# Patient Record
Sex: MALE | Race: WHITE | Employment: OTHER | URBAN - METROPOLITAN AREA
[De-identification: names, ages, dates, MRNs, and addresses within clinical notes are randomized per-mention and may not be internally consistent; named-entity substitution may affect disease eponyms.]

---

## 2017-01-01 ENCOUNTER — HOSPITAL ENCOUNTER (OUTPATIENT)
Age: 77
Setting detail: OUTPATIENT SURGERY
Discharge: HOME | End: 2017-08-28
Attending: UROLOGY | Admitting: UROLOGY
Payer: MEDICARE

## 2017-01-01 ENCOUNTER — HOSPITAL ENCOUNTER (OUTPATIENT)
Age: 77
Setting detail: OUTPATIENT SURGERY
Discharge: HOME | End: 2017-09-06
Attending: UROLOGY | Admitting: UROLOGY
Payer: MEDICARE

## 2017-01-01 ENCOUNTER — HOSPITAL ENCOUNTER (EMERGENCY)
Age: 77
Discharge: HOME | End: 2017-05-26
Attending: EMERGENCY MEDICINE
Payer: MEDICARE

## 2017-01-01 ENCOUNTER — APPOINTMENT (OUTPATIENT)
Dept: CT IMAGING | Age: 77
DRG: 190 | End: 2017-01-01
Attending: EMERGENCY MEDICINE
Payer: MEDICARE

## 2017-01-01 ENCOUNTER — APPOINTMENT (OUTPATIENT)
Dept: GENERAL RADIOLOGY | Age: 77
End: 2017-01-01
Attending: UROLOGY
Payer: MEDICARE

## 2017-01-01 ENCOUNTER — ANESTHESIA (OUTPATIENT)
Dept: SURGERY | Age: 77
End: 2017-01-01
Payer: MEDICARE

## 2017-01-01 ENCOUNTER — APPOINTMENT (OUTPATIENT)
Dept: GENERAL RADIOLOGY | Age: 77
DRG: 190 | End: 2017-01-01
Attending: EMERGENCY MEDICINE
Payer: MEDICARE

## 2017-01-01 ENCOUNTER — APPOINTMENT (OUTPATIENT)
Dept: CT IMAGING | Age: 77
End: 2017-01-01
Attending: EMERGENCY MEDICINE
Payer: MEDICARE

## 2017-01-01 ENCOUNTER — APPOINTMENT (OUTPATIENT)
Dept: GENERAL RADIOLOGY | Age: 77
End: 2017-01-01
Attending: EMERGENCY MEDICINE
Payer: MEDICARE

## 2017-01-01 ENCOUNTER — APPOINTMENT (OUTPATIENT)
Dept: GENERAL RADIOLOGY | Age: 77
DRG: 190 | End: 2017-01-01
Attending: HOSPITALIST
Payer: MEDICARE

## 2017-01-01 ENCOUNTER — ANESTHESIA EVENT (OUTPATIENT)
Dept: SURGERY | Age: 77
End: 2017-01-01
Payer: MEDICARE

## 2017-01-01 ENCOUNTER — HOSPITAL ENCOUNTER (OUTPATIENT)
Dept: PREADMISSION TESTING | Age: 77
Discharge: HOME | End: 2017-08-16
Payer: MEDICARE

## 2017-01-01 ENCOUNTER — HOSPITAL ENCOUNTER (OUTPATIENT)
Dept: LAB | Age: 77
Discharge: HOME | End: 2017-08-16
Payer: MEDICARE

## 2017-01-01 ENCOUNTER — HOSPITAL ENCOUNTER (INPATIENT)
Age: 77
LOS: 5 days | Discharge: HOME HEALTH | DRG: 190 | End: 2017-03-28
Attending: EMERGENCY MEDICINE | Admitting: HOSPITALIST
Payer: MEDICARE

## 2017-01-01 VITALS
HEART RATE: 65 BPM | TEMPERATURE: 98.2 F | BODY MASS INDEX: 39.68 KG/M2 | RESPIRATION RATE: 16 BRPM | WEIGHT: 246.91 LBS | HEIGHT: 66 IN | OXYGEN SATURATION: 95 % | SYSTOLIC BLOOD PRESSURE: 142 MMHG | DIASTOLIC BLOOD PRESSURE: 66 MMHG

## 2017-01-01 VITALS
DIASTOLIC BLOOD PRESSURE: 67 MMHG | HEART RATE: 64 BPM | WEIGHT: 249.1 LBS | RESPIRATION RATE: 15 BRPM | SYSTOLIC BLOOD PRESSURE: 121 MMHG | OXYGEN SATURATION: 97 % | HEIGHT: 68 IN | BODY MASS INDEX: 37.75 KG/M2 | TEMPERATURE: 97.7 F

## 2017-01-01 VITALS
SYSTOLIC BLOOD PRESSURE: 146 MMHG | DIASTOLIC BLOOD PRESSURE: 75 MMHG | HEIGHT: 68 IN | HEART RATE: 63 BPM | OXYGEN SATURATION: 92 % | WEIGHT: 245.31 LBS | BODY MASS INDEX: 37.18 KG/M2 | TEMPERATURE: 97 F | RESPIRATION RATE: 16 BRPM

## 2017-01-01 VITALS
TEMPERATURE: 97.9 F | DIASTOLIC BLOOD PRESSURE: 84 MMHG | WEIGHT: 250 LBS | RESPIRATION RATE: 18 BRPM | HEIGHT: 66 IN | HEART RATE: 65 BPM | SYSTOLIC BLOOD PRESSURE: 143 MMHG | BODY MASS INDEX: 40.18 KG/M2 | OXYGEN SATURATION: 93 %

## 2017-01-01 DIAGNOSIS — R09.02 HYPOXIA: Primary | ICD-10-CM

## 2017-01-01 DIAGNOSIS — C61 MALIGNANT NEOPLASM OF PROSTATE (HCC): ICD-10-CM

## 2017-01-01 DIAGNOSIS — C61 PROSTATE CA (HCC): ICD-10-CM

## 2017-01-01 DIAGNOSIS — C67.9 BLADDER CANCER (HCC): ICD-10-CM

## 2017-01-01 DIAGNOSIS — Z01.818 PRE-OP TESTING: ICD-10-CM

## 2017-01-01 DIAGNOSIS — C67.9 MALIGNANT NEOPLASM OF URINARY BLADDER, UNSPECIFIED SITE (HCC): Chronic | ICD-10-CM

## 2017-01-01 DIAGNOSIS — N20.0 KIDNEY STONE: ICD-10-CM

## 2017-01-01 DIAGNOSIS — R79.89 ELEVATED LACTIC ACID LEVEL: ICD-10-CM

## 2017-01-01 DIAGNOSIS — J20.8 ACUTE BRONCHITIS DUE TO OTHER SPECIFIED ORGANISMS: Primary | ICD-10-CM

## 2017-01-01 LAB
ALBUMIN SERPL BCP-MCNC: 3.1 G/DL (ref 3.4–5)
ALBUMIN SERPL BCP-MCNC: 3.5 G/DL (ref 3.4–5)
ALBUMIN/GLOB SERPL: 1 {RATIO} (ref 0.8–1.7)
ALBUMIN/GLOB SERPL: 1.1 {RATIO} (ref 0.8–1.7)
ALP SERPL-CCNC: 52 U/L (ref 45–117)
ALP SERPL-CCNC: 67 U/L (ref 45–117)
ALT SERPL-CCNC: 19 U/L (ref 16–61)
ALT SERPL-CCNC: 23 U/L (ref 16–61)
ANION GAP BLD CALC-SCNC: 11 MMOL/L (ref 3–18)
ANION GAP BLD CALC-SCNC: 13 MMOL/L (ref 3–18)
ANION GAP BLD CALC-SCNC: 5 MMOL/L (ref 3–18)
ANION GAP BLD CALC-SCNC: 8 MMOL/L (ref 3–18)
ANION GAP BLD CALC-SCNC: 9 MMOL/L (ref 3–18)
ANION GAP SERPL CALC-SCNC: 10 MMOL/L (ref 3–18)
APPEARANCE UR: CLEAR
APPEARANCE UR: CLEAR
APTT PPP: 31.2 SEC (ref 23–36.4)
APTT PPP: 39.1 SEC (ref 23–36.4)
ARTERIAL PATENCY WRIST A: YES
AST SERPL W P-5'-P-CCNC: 34 U/L (ref 15–37)
AST SERPL W P-5'-P-CCNC: 46 U/L (ref 15–37)
ATRIAL RATE: 357 BPM
ATRIAL RATE: 60 BPM
ATRIAL RATE: 74 BPM
BACTERIA SPEC CULT: NORMAL
BACTERIA URNS QL MICRO: ABNORMAL /HPF
BACTERIA URNS QL MICRO: ABNORMAL /HPF
BASE DEFICIT BLD-SCNC: 3 MMOL/L
BASOPHILS # BLD AUTO: 0 K/UL (ref 0–0.06)
BASOPHILS # BLD: 0 % (ref 0–2)
BASOPHILS # BLD: 1 % (ref 0–2)
BASOPHILS # BLD: 1 % (ref 0–2)
BDY SITE: ABNORMAL
BILIRUB SERPL-MCNC: 0.4 MG/DL (ref 0.2–1)
BILIRUB SERPL-MCNC: 0.7 MG/DL (ref 0.2–1)
BILIRUB UR QL: NEGATIVE
BILIRUB UR QL: NEGATIVE
BNP SERPL-MCNC: 1602 PG/ML (ref 0–1800)
BUN BLD-MCNC: 14 MG/DL (ref 7–18)
BUN SERPL-MCNC: 16 MG/DL (ref 7–18)
BUN SERPL-MCNC: 16 MG/DL (ref 7–18)
BUN SERPL-MCNC: 20 MG/DL (ref 7–18)
BUN SERPL-MCNC: 22 MG/DL (ref 7–18)
BUN SERPL-MCNC: 27 MG/DL (ref 7–18)
BUN SERPL-MCNC: 29 MG/DL (ref 7–18)
BUN/CREAT SERPL: 14 (ref 12–20)
BUN/CREAT SERPL: 15 (ref 12–20)
BUN/CREAT SERPL: 16 (ref 12–20)
BUN/CREAT SERPL: 19 (ref 12–20)
BUN/CREAT SERPL: 22 (ref 12–20)
BUN/CREAT SERPL: 24 (ref 12–20)
CA-I SERPL-SCNC: 1.1 MMOL/L (ref 1.12–1.32)
CALCIUM SERPL-MCNC: 7.5 MG/DL (ref 8.5–10.1)
CALCIUM SERPL-MCNC: 7.6 MG/DL (ref 8.5–10.1)
CALCIUM SERPL-MCNC: 7.7 MG/DL (ref 8.5–10.1)
CALCIUM SERPL-MCNC: 8.1 MG/DL (ref 8.5–10.1)
CALCIUM SERPL-MCNC: 8.6 MG/DL (ref 8.5–10.1)
CALCIUM SERPL-MCNC: 8.7 MG/DL (ref 8.5–10.1)
CALCULATED R AXIS, ECG10: -57 DEGREES
CALCULATED R AXIS, ECG10: -68 DEGREES
CALCULATED R AXIS, ECG10: -73 DEGREES
CALCULATED T AXIS, ECG11: 109 DEGREES
CALCULATED T AXIS, ECG11: 110 DEGREES
CALCULATED T AXIS, ECG11: 111 DEGREES
CHLORIDE BLD-SCNC: 108 MMOL/L (ref 100–108)
CHLORIDE SERPL-SCNC: 103 MMOL/L (ref 100–108)
CHLORIDE SERPL-SCNC: 103 MMOL/L (ref 100–108)
CHLORIDE SERPL-SCNC: 105 MMOL/L (ref 100–108)
CHLORIDE SERPL-SCNC: 105 MMOL/L (ref 100–108)
CHLORIDE SERPL-SCNC: 107 MMOL/L (ref 100–108)
CHLORIDE SERPL-SCNC: 107 MMOL/L (ref 100–108)
CK MB CFR SERPL CALC: 1 % (ref 0–4)
CK MB CFR SERPL CALC: 2.1 % (ref 0–4)
CK MB SERPL-MCNC: 1.6 NG/ML (ref 5–25)
CK MB SERPL-MCNC: 3.1 NG/ML (ref 5–25)
CK SERPL-CCNC: 309 U/L (ref 39–308)
CK SERPL-CCNC: 75 U/L (ref 39–308)
CO2 SERPL-SCNC: 24 MMOL/L (ref 21–32)
CO2 SERPL-SCNC: 27 MMOL/L (ref 21–32)
CO2 SERPL-SCNC: 30 MMOL/L (ref 21–32)
COLOR UR: YELLOW
COLOR UR: YELLOW
CREAT SERPL-MCNC: 1 MG/DL (ref 0.6–1.3)
CREAT SERPL-MCNC: 1.03 MG/DL (ref 0.6–1.3)
CREAT SERPL-MCNC: 1.18 MG/DL (ref 0.6–1.3)
CREAT SERPL-MCNC: 1.19 MG/DL (ref 0.6–1.3)
CREAT SERPL-MCNC: 1.22 MG/DL (ref 0.6–1.3)
CREAT SERPL-MCNC: 1.43 MG/DL (ref 0.6–1.3)
DIAGNOSIS, 93000: NORMAL
DIFFERENTIAL METHOD BLD: ABNORMAL
EOSINOPHIL # BLD: 0 K/UL (ref 0–0.4)
EOSINOPHIL # BLD: 0.1 K/UL (ref 0–0.4)
EOSINOPHIL NFR BLD: 0 % (ref 0–5)
EOSINOPHIL NFR BLD: 2 % (ref 0–5)
EPITH CASTS URNS QL MICRO: ABNORMAL /LPF (ref 0–5)
EPITH CASTS URNS QL MICRO: ABNORMAL /LPF (ref 0–5)
ERYTHROCYTE [DISTWIDTH] IN BLOOD BY AUTOMATED COUNT: 15.5 % (ref 11.6–14.5)
ERYTHROCYTE [DISTWIDTH] IN BLOOD BY AUTOMATED COUNT: 15.7 % (ref 11.6–14.5)
ERYTHROCYTE [DISTWIDTH] IN BLOOD BY AUTOMATED COUNT: 15.7 % (ref 11.6–14.5)
ERYTHROCYTE [DISTWIDTH] IN BLOOD BY AUTOMATED COUNT: 15.8 % (ref 11.6–14.5)
ERYTHROCYTE [DISTWIDTH] IN BLOOD BY AUTOMATED COUNT: 18.8 % (ref 11.6–14.5)
EST. AVERAGE GLUCOSE BLD GHB EST-MCNC: 140 MG/DL
FLUAV AG NPH QL IA: NEGATIVE
FLUBV AG NOSE QL IA: NEGATIVE
GAS FLOW.O2 O2 DELIVERY SYS: ABNORMAL L/MIN
GAS FLOW.O2 SETTING OXYMISER: 40 L/M
GLOBULIN SER CALC-MCNC: 3 G/DL (ref 2–4)
GLOBULIN SER CALC-MCNC: 3.2 G/DL (ref 2–4)
GLUCOSE BLD STRIP.AUTO-MCNC: 100 MG/DL (ref 70–110)
GLUCOSE BLD STRIP.AUTO-MCNC: 102 MG/DL (ref 70–110)
GLUCOSE BLD STRIP.AUTO-MCNC: 105 MG/DL (ref 70–110)
GLUCOSE BLD STRIP.AUTO-MCNC: 106 MG/DL (ref 70–110)
GLUCOSE BLD STRIP.AUTO-MCNC: 113 MG/DL (ref 70–110)
GLUCOSE BLD STRIP.AUTO-MCNC: 116 MG/DL (ref 74–106)
GLUCOSE BLD STRIP.AUTO-MCNC: 118 MG/DL (ref 70–110)
GLUCOSE BLD STRIP.AUTO-MCNC: 124 MG/DL (ref 70–110)
GLUCOSE BLD STRIP.AUTO-MCNC: 129 MG/DL (ref 70–110)
GLUCOSE BLD STRIP.AUTO-MCNC: 129 MG/DL (ref 70–110)
GLUCOSE BLD STRIP.AUTO-MCNC: 130 MG/DL (ref 70–110)
GLUCOSE BLD STRIP.AUTO-MCNC: 130 MG/DL (ref 70–110)
GLUCOSE BLD STRIP.AUTO-MCNC: 132 MG/DL (ref 70–110)
GLUCOSE BLD STRIP.AUTO-MCNC: 143 MG/DL (ref 70–110)
GLUCOSE BLD STRIP.AUTO-MCNC: 150 MG/DL (ref 70–110)
GLUCOSE BLD STRIP.AUTO-MCNC: 154 MG/DL (ref 70–110)
GLUCOSE BLD STRIP.AUTO-MCNC: 163 MG/DL (ref 70–110)
GLUCOSE BLD STRIP.AUTO-MCNC: 164 MG/DL (ref 70–110)
GLUCOSE BLD STRIP.AUTO-MCNC: 197 MG/DL (ref 70–110)
GLUCOSE BLD STRIP.AUTO-MCNC: 96 MG/DL (ref 70–110)
GLUCOSE BLD STRIP.AUTO-MCNC: 97 MG/DL (ref 70–110)
GLUCOSE BLD STRIP.AUTO-MCNC: 98 MG/DL (ref 70–110)
GLUCOSE BLD STRIP.AUTO-MCNC: 99 MG/DL (ref 70–110)
GLUCOSE SERPL-MCNC: 107 MG/DL (ref 74–99)
GLUCOSE SERPL-MCNC: 109 MG/DL (ref 74–99)
GLUCOSE SERPL-MCNC: 120 MG/DL (ref 74–99)
GLUCOSE SERPL-MCNC: 127 MG/DL (ref 74–99)
GLUCOSE SERPL-MCNC: 82 MG/DL (ref 74–99)
GLUCOSE SERPL-MCNC: 96 MG/DL (ref 74–99)
GLUCOSE UR STRIP.AUTO-MCNC: NEGATIVE MG/DL
GLUCOSE UR STRIP.AUTO-MCNC: NEGATIVE MG/DL
GRAN CASTS URNS QL MICRO: ABNORMAL /LPF
HBA1C MFR BLD: 6.5 % (ref 4.2–5.6)
HCO3 BLD-SCNC: 22.6 MMOL/L (ref 22–26)
HCT VFR BLD AUTO: 37.9 % (ref 36–48)
HCT VFR BLD AUTO: 37.9 % (ref 36–48)
HCT VFR BLD AUTO: 38 % (ref 36–48)
HCT VFR BLD AUTO: 38.1 % (ref 36–48)
HCT VFR BLD AUTO: 39.2 % (ref 36–48)
HCT VFR BLD CALC: 42 % (ref 36–49)
HGB BLD-MCNC: 11.3 G/DL (ref 13–16)
HGB BLD-MCNC: 11.4 G/DL (ref 13–16)
HGB BLD-MCNC: 11.6 G/DL (ref 13–16)
HGB BLD-MCNC: 11.7 G/DL (ref 13–16)
HGB BLD-MCNC: 12.1 G/DL (ref 13–16)
HGB BLD-MCNC: 14.3 G/DL (ref 12–16)
HGB UR QL STRIP: ABNORMAL
HGB UR QL STRIP: NEGATIVE
HYALINE CASTS URNS QL MICRO: ABNORMAL /LPF (ref 0–2)
INR PPP: 1 (ref 0.8–1.2)
INR PPP: 1 (ref 0.8–1.2)
INR PPP: 1.5 (ref 0.8–1.2)
INR PPP: 1.5 (ref 0.8–1.2)
INR PPP: 1.6 (ref 0.8–1.2)
INR PPP: 1.6 (ref 0.8–1.2)
INR PPP: 1.7 (ref 0.8–1.2)
INR PPP: 2 (ref 0.8–1.2)
INR PPP: 2 (ref 0.8–1.2)
INR PPP: 2.3 (ref 0.8–1.2)
KETONES UR QL STRIP.AUTO: ABNORMAL MG/DL
KETONES UR QL STRIP.AUTO: NEGATIVE MG/DL
LACTATE BLD-SCNC: 2.6 MMOL/L (ref 0.4–2)
LACTATE BLD-SCNC: 4.6 MMOL/L (ref 0.4–2)
LEUKOCYTE ESTERASE UR QL STRIP.AUTO: ABNORMAL
LEUKOCYTE ESTERASE UR QL STRIP.AUTO: ABNORMAL
LYMPHOCYTES # BLD AUTO: 18 % (ref 21–52)
LYMPHOCYTES # BLD AUTO: 25 % (ref 21–52)
LYMPHOCYTES # BLD AUTO: 27 % (ref 21–52)
LYMPHOCYTES # BLD AUTO: 32 % (ref 21–52)
LYMPHOCYTES # BLD AUTO: 32 % (ref 21–52)
LYMPHOCYTES # BLD: 1.2 K/UL (ref 0.9–3.6)
LYMPHOCYTES # BLD: 1.3 K/UL (ref 0.9–3.6)
LYMPHOCYTES # BLD: 1.4 K/UL (ref 0.9–3.6)
LYMPHOCYTES # BLD: 1.4 K/UL (ref 0.9–3.6)
LYMPHOCYTES # BLD: 2.7 K/UL (ref 0.9–3.6)
MAGNESIUM SERPL-MCNC: 1.9 MG/DL (ref 1.8–2.4)
MAGNESIUM SERPL-MCNC: 2.3 MG/DL (ref 1.6–2.6)
MCH RBC QN AUTO: 24.2 PG (ref 24–34)
MCH RBC QN AUTO: 24.3 PG (ref 24–34)
MCH RBC QN AUTO: 24.6 PG (ref 24–34)
MCHC RBC AUTO-ENTMCNC: 29.8 G/DL (ref 31–37)
MCHC RBC AUTO-ENTMCNC: 30 G/DL (ref 31–37)
MCHC RBC AUTO-ENTMCNC: 30.6 G/DL (ref 31–37)
MCHC RBC AUTO-ENTMCNC: 30.7 G/DL (ref 31–37)
MCHC RBC AUTO-ENTMCNC: 30.9 G/DL (ref 31–37)
MCV RBC AUTO: 78.7 FL (ref 74–97)
MCV RBC AUTO: 78.9 FL (ref 74–97)
MCV RBC AUTO: 80.3 FL (ref 74–97)
MCV RBC AUTO: 80.5 FL (ref 74–97)
MCV RBC AUTO: 81.2 FL (ref 74–97)
MONOCYTES # BLD: 0.5 K/UL (ref 0.05–1.2)
MONOCYTES # BLD: 0.5 K/UL (ref 0.05–1.2)
MONOCYTES # BLD: 0.6 K/UL (ref 0.05–1.2)
MONOCYTES # BLD: 0.8 K/UL (ref 0.05–1.2)
MONOCYTES # BLD: 0.9 K/UL (ref 0.05–1.2)
MONOCYTES NFR BLD AUTO: 10 % (ref 3–10)
MONOCYTES NFR BLD AUTO: 10 % (ref 3–10)
MONOCYTES NFR BLD AUTO: 11 % (ref 3–10)
MONOCYTES NFR BLD AUTO: 12 % (ref 3–10)
MONOCYTES NFR BLD AUTO: 9 % (ref 3–10)
MUCOUS THREADS URNS QL MICRO: ABNORMAL /LPF
NEUTS SEG # BLD: 2.6 K/UL (ref 1.8–8)
NEUTS SEG # BLD: 3 K/UL (ref 1.8–8)
NEUTS SEG # BLD: 3.8 K/UL (ref 1.8–8)
NEUTS SEG # BLD: 4.7 K/UL (ref 1.8–8)
NEUTS SEG # BLD: 4.9 K/UL (ref 1.8–8)
NEUTS SEG NFR BLD AUTO: 55 % (ref 40–73)
NEUTS SEG NFR BLD AUTO: 58 % (ref 40–73)
NEUTS SEG NFR BLD AUTO: 62 % (ref 40–73)
NEUTS SEG NFR BLD AUTO: 65 % (ref 40–73)
NEUTS SEG NFR BLD AUTO: 70 % (ref 40–73)
NITRITE UR QL STRIP.AUTO: NEGATIVE
NITRITE UR QL STRIP.AUTO: NEGATIVE
O2/TOTAL GAS SETTING VFR VENT: 60 %
PCO2 BLD: 42.4 MMHG (ref 35–45)
PH BLD: 7.34 [PH] (ref 7.35–7.45)
PH UR STRIP: 5.5 [PH] (ref 5–8)
PH UR STRIP: 6 [PH] (ref 5–8)
PLATELET # BLD AUTO: 153 K/UL (ref 135–420)
PLATELET # BLD AUTO: 159 K/UL (ref 135–420)
PLATELET # BLD AUTO: 186 K/UL (ref 135–420)
PLATELET # BLD AUTO: 194 K/UL (ref 135–420)
PLATELET # BLD AUTO: 225 K/UL (ref 135–420)
PMV BLD AUTO: 10.2 FL (ref 9.2–11.8)
PMV BLD AUTO: 10.2 FL (ref 9.2–11.8)
PMV BLD AUTO: 10.4 FL (ref 9.2–11.8)
PMV BLD AUTO: 9.7 FL (ref 9.2–11.8)
PMV BLD AUTO: 9.9 FL (ref 9.2–11.8)
PO2 BLD: 60 MMHG (ref 80–100)
POTASSIUM BLD-SCNC: 5.1 MMOL/L (ref 3.5–5.5)
POTASSIUM SERPL-SCNC: 3.2 MMOL/L (ref 3.5–5.5)
POTASSIUM SERPL-SCNC: 3.6 MMOL/L (ref 3.5–5.5)
POTASSIUM SERPL-SCNC: 3.6 MMOL/L (ref 3.5–5.5)
POTASSIUM SERPL-SCNC: 3.7 MMOL/L (ref 3.5–5.5)
POTASSIUM SERPL-SCNC: 3.9 MMOL/L (ref 3.5–5.5)
POTASSIUM SERPL-SCNC: 4 MMOL/L (ref 3.5–5.5)
PROT SERPL-MCNC: 6.1 G/DL (ref 6.4–8.2)
PROT SERPL-MCNC: 6.7 G/DL (ref 6.4–8.2)
PROT UR STRIP-MCNC: 100 MG/DL
PROT UR STRIP-MCNC: 30 MG/DL
PROTHROMBIN TIME: 12.4 SEC (ref 11.5–15.2)
PROTHROMBIN TIME: 12.7 SEC (ref 11.5–15.2)
PROTHROMBIN TIME: 17.3 SEC (ref 11.5–15.2)
PROTHROMBIN TIME: 17.4 SEC (ref 11.5–15.2)
PROTHROMBIN TIME: 18 SEC (ref 11.5–15.2)
PROTHROMBIN TIME: 18.5 SEC (ref 11.5–15.2)
PROTHROMBIN TIME: 18.9 SEC (ref 11.5–15.2)
PROTHROMBIN TIME: 21.4 SEC (ref 11.5–15.2)
PROTHROMBIN TIME: 21.5 SEC (ref 11.5–15.2)
PROTHROMBIN TIME: 24.3 SEC (ref 11.5–15.2)
Q-T INTERVAL, ECG07: 488 MS
Q-T INTERVAL, ECG07: 490 MS
Q-T INTERVAL, ECG07: 492 MS
QRS DURATION, ECG06: 178 MS
QRS DURATION, ECG06: 188 MS
QRS DURATION, ECG06: 188 MS
QTC CALCULATION (BEZET), ECG08: 490 MS
QTC CALCULATION (BEZET), ECG08: 499 MS
QTC CALCULATION (BEZET), ECG08: 503 MS
RBC # BLD AUTO: 4.67 M/UL (ref 4.7–5.5)
RBC # BLD AUTO: 4.72 M/UL (ref 4.7–5.5)
RBC # BLD AUTO: 4.72 M/UL (ref 4.7–5.5)
RBC # BLD AUTO: 4.84 M/UL (ref 4.7–5.5)
RBC # BLD AUTO: 4.97 M/UL (ref 4.7–5.5)
RBC #/AREA URNS HPF: ABNORMAL /HPF (ref 0–5)
RBC #/AREA URNS HPF: ABNORMAL /HPF (ref 0–5)
SAO2 % BLD: 89 % (ref 92–97)
SERVICE CMNT-IMP: ABNORMAL
SERVICE CMNT-IMP: NORMAL
SODIUM BLD-SCNC: 143 MMOL/L (ref 136–145)
SODIUM SERPL-SCNC: 139 MMOL/L (ref 136–145)
SODIUM SERPL-SCNC: 141 MMOL/L (ref 136–145)
SODIUM SERPL-SCNC: 142 MMOL/L (ref 136–145)
SP GR UR REFRACTOMETRY: 1.02 (ref 1–1.03)
SP GR UR REFRACTOMETRY: 1.03 (ref 1–1.03)
SPECIMEN TYPE: ABNORMAL
TOTAL RESP. RATE, ITRR: 22
TROPONIN I SERPL-MCNC: 0.07 NG/ML (ref 0–0.04)
TROPONIN I SERPL-MCNC: <0.02 NG/ML (ref 0–0.04)
UROBILINOGEN UR QL STRIP.AUTO: 1 EU/DL (ref 0.2–1)
UROBILINOGEN UR QL STRIP.AUTO: 1 EU/DL (ref 0.2–1)
VENTRICULAR RATE, ECG03: 60 BPM
VENTRICULAR RATE, ECG03: 63 BPM
VENTRICULAR RATE, ECG03: 63 BPM
WBC # BLD AUTO: 4.5 K/UL (ref 4.6–13.2)
WBC # BLD AUTO: 4.8 K/UL (ref 4.6–13.2)
WBC # BLD AUTO: 5.7 K/UL (ref 4.6–13.2)
WBC # BLD AUTO: 7 K/UL (ref 4.6–13.2)
WBC # BLD AUTO: 8.4 K/UL (ref 4.6–13.2)
WBC URNS QL MICRO: ABNORMAL /HPF (ref 0–4)
WBC URNS QL MICRO: ABNORMAL /HPF (ref 0–4)

## 2017-01-01 PROCEDURE — 85025 COMPLETE CBC W/AUTO DIFF WBC: CPT | Performed by: EMERGENCY MEDICINE

## 2017-01-01 PROCEDURE — 77010033678 HC OXYGEN DAILY

## 2017-01-01 PROCEDURE — 77030013140 HC MSK NEB VYRM -A

## 2017-01-01 PROCEDURE — 87804 INFLUENZA ASSAY W/OPTIC: CPT | Performed by: EMERGENCY MEDICINE

## 2017-01-01 PROCEDURE — 76210000016 HC OR PH I REC 1 TO 1.5 HR: Performed by: UROLOGY

## 2017-01-01 PROCEDURE — C1758 CATHETER, URETERAL: HCPCS | Performed by: UROLOGY

## 2017-01-01 PROCEDURE — 74011250636 HC RX REV CODE- 250/636: Performed by: UROLOGY

## 2017-01-01 PROCEDURE — 82962 GLUCOSE BLOOD TEST: CPT

## 2017-01-01 PROCEDURE — 74011000250 HC RX REV CODE- 250

## 2017-01-01 PROCEDURE — 77030032490 HC SLV COMPR SCD KNE COVD -B: Performed by: UROLOGY

## 2017-01-01 PROCEDURE — 74011250636 HC RX REV CODE- 250/636: Performed by: NURSE ANESTHETIST, CERTIFIED REGISTERED

## 2017-01-01 PROCEDURE — 93005 ELECTROCARDIOGRAM TRACING: CPT

## 2017-01-01 PROCEDURE — 83605 ASSAY OF LACTIC ACID: CPT

## 2017-01-01 PROCEDURE — 77030034849

## 2017-01-01 PROCEDURE — 36415 COLL VENOUS BLD VENIPUNCTURE: CPT | Performed by: HOSPITALIST

## 2017-01-01 PROCEDURE — 94640 AIRWAY INHALATION TREATMENT: CPT

## 2017-01-01 PROCEDURE — 65660000000 HC RM CCU STEPDOWN

## 2017-01-01 PROCEDURE — 74011250637 HC RX REV CODE- 250/637: Performed by: NURSE ANESTHETIST, CERTIFIED REGISTERED

## 2017-01-01 PROCEDURE — 74011250636 HC RX REV CODE- 250/636: Performed by: EMERGENCY MEDICINE

## 2017-01-01 PROCEDURE — 87086 URINE CULTURE/COLONY COUNT: CPT | Performed by: EMERGENCY MEDICINE

## 2017-01-01 PROCEDURE — 74011250636 HC RX REV CODE- 250/636: Performed by: HOSPITALIST

## 2017-01-01 PROCEDURE — 74011250636 HC RX REV CODE- 250/636

## 2017-01-01 PROCEDURE — 85610 PROTHROMBIN TIME: CPT | Performed by: UROLOGY

## 2017-01-01 PROCEDURE — 81001 URINALYSIS AUTO W/SCOPE: CPT | Performed by: UROLOGY

## 2017-01-01 PROCEDURE — 85610 PROTHROMBIN TIME: CPT | Performed by: HOSPITALIST

## 2017-01-01 PROCEDURE — 96365 THER/PROPH/DIAG IV INF INIT: CPT

## 2017-01-01 PROCEDURE — 76010000161 HC OR TIME 1 TO 1.5 HR INTENSV-TIER 1: Performed by: UROLOGY

## 2017-01-01 PROCEDURE — C1769 GUIDE WIRE: HCPCS | Performed by: UROLOGY

## 2017-01-01 PROCEDURE — 87086 URINE CULTURE/COLONY COUNT: CPT | Performed by: UROLOGY

## 2017-01-01 PROCEDURE — 74011000250 HC RX REV CODE- 250: Performed by: HOSPITALIST

## 2017-01-01 PROCEDURE — 70450 CT HEAD/BRAIN W/O DYE: CPT

## 2017-01-01 PROCEDURE — 71275 CT ANGIOGRAPHY CHEST: CPT

## 2017-01-01 PROCEDURE — C2617 STENT, NON-COR, TEM W/O DEL: HCPCS | Performed by: UROLOGY

## 2017-01-01 PROCEDURE — 85025 COMPLETE CBC W/AUTO DIFF WBC: CPT | Performed by: HOSPITALIST

## 2017-01-01 PROCEDURE — 77030013079 HC BLNKT BAIR HGGR 3M -A: Performed by: NURSE ANESTHETIST, CERTIFIED REGISTERED

## 2017-01-01 PROCEDURE — 74011000258 HC RX REV CODE- 258: Performed by: EMERGENCY MEDICINE

## 2017-01-01 PROCEDURE — 51702 INSERT TEMP BLADDER CATH: CPT

## 2017-01-01 PROCEDURE — 82550 ASSAY OF CK (CPK): CPT | Performed by: EMERGENCY MEDICINE

## 2017-01-01 PROCEDURE — 74011636320 HC RX REV CODE- 636/320: Performed by: UROLOGY

## 2017-01-01 PROCEDURE — 96361 HYDRATE IV INFUSION ADD-ON: CPT

## 2017-01-01 PROCEDURE — 97530 THERAPEUTIC ACTIVITIES: CPT

## 2017-01-01 PROCEDURE — 80048 BASIC METABOLIC PNL TOTAL CA: CPT | Performed by: HOSPITALIST

## 2017-01-01 PROCEDURE — 82947 ASSAY GLUCOSE BLOOD QUANT: CPT

## 2017-01-01 PROCEDURE — 74011250637 HC RX REV CODE- 250/637: Performed by: HOSPITALIST

## 2017-01-01 PROCEDURE — 96367 TX/PROPH/DG ADDL SEQ IV INF: CPT

## 2017-01-01 PROCEDURE — 85610 PROTHROMBIN TIME: CPT | Performed by: EMERGENCY MEDICINE

## 2017-01-01 PROCEDURE — 80048 BASIC METABOLIC PNL TOTAL CA: CPT | Performed by: UROLOGY

## 2017-01-01 PROCEDURE — 85730 THROMBOPLASTIN TIME PARTIAL: CPT | Performed by: EMERGENCY MEDICINE

## 2017-01-01 PROCEDURE — 76210000021 HC REC RM PH II 0.5 TO 1 HR: Performed by: UROLOGY

## 2017-01-01 PROCEDURE — 85610 PROTHROMBIN TIME: CPT

## 2017-01-01 PROCEDURE — 71010 XR CHEST PORT: CPT

## 2017-01-01 PROCEDURE — 97161 PT EVAL LOW COMPLEX 20 MIN: CPT

## 2017-01-01 PROCEDURE — 74011636320 HC RX REV CODE- 636/320: Performed by: EMERGENCY MEDICINE

## 2017-01-01 PROCEDURE — 36415 COLL VENOUS BLD VENIPUNCTURE: CPT | Performed by: UROLOGY

## 2017-01-01 PROCEDURE — 74420 UROGRAPHY RTRGR +-KUB: CPT

## 2017-01-01 PROCEDURE — 83735 ASSAY OF MAGNESIUM: CPT | Performed by: EMERGENCY MEDICINE

## 2017-01-01 PROCEDURE — 71020 XR CHEST PA LAT: CPT

## 2017-01-01 PROCEDURE — 74011250637 HC RX REV CODE- 250/637: Performed by: EMERGENCY MEDICINE

## 2017-01-01 PROCEDURE — 77030018823 HC SLV COMPR VENO -B: Performed by: UROLOGY

## 2017-01-01 PROCEDURE — 83036 HEMOGLOBIN GLYCOSYLATED A1C: CPT | Performed by: HOSPITALIST

## 2017-01-01 PROCEDURE — 80053 COMPREHEN METABOLIC PANEL: CPT | Performed by: EMERGENCY MEDICINE

## 2017-01-01 PROCEDURE — 77030018836 HC SOL IRR NACL ICUM -A: Performed by: UROLOGY

## 2017-01-01 PROCEDURE — 77030018846 HC SOL IRR STRL H20 ICUM -A: Performed by: UROLOGY

## 2017-01-01 PROCEDURE — 77030008477 HC STYL SATN SLP COVD -A: Performed by: ANESTHESIOLOGY

## 2017-01-01 PROCEDURE — 87040 BLOOD CULTURE FOR BACTERIA: CPT | Performed by: EMERGENCY MEDICINE

## 2017-01-01 PROCEDURE — 77030008683 HC TU ET CUF COVD -A: Performed by: ANESTHESIOLOGY

## 2017-01-01 PROCEDURE — 82803 BLOOD GASES ANY COMBINATION: CPT

## 2017-01-01 PROCEDURE — 76060000033 HC ANESTHESIA 1 TO 1.5 HR: Performed by: UROLOGY

## 2017-01-01 PROCEDURE — 80053 COMPREHEN METABOLIC PANEL: CPT | Performed by: HOSPITALIST

## 2017-01-01 PROCEDURE — 99285 EMERGENCY DEPT VISIT HI MDM: CPT

## 2017-01-01 PROCEDURE — 36600 WITHDRAWAL OF ARTERIAL BLOOD: CPT

## 2017-01-01 PROCEDURE — 80048 BASIC METABOLIC PNL TOTAL CA: CPT | Performed by: EMERGENCY MEDICINE

## 2017-01-01 PROCEDURE — 76210000026 HC REC RM PH II 1 TO 1.5 HR: Performed by: UROLOGY

## 2017-01-01 PROCEDURE — 82330 ASSAY OF CALCIUM: CPT | Performed by: EMERGENCY MEDICINE

## 2017-01-01 PROCEDURE — 77030008683 HC TU ET CUF COVD -A: Performed by: NURSE ANESTHETIST, CERTIFIED REGISTERED

## 2017-01-01 PROCEDURE — 77030013079 HC BLNKT BAIR HGGR 3M -A: Performed by: ANESTHESIOLOGY

## 2017-01-01 PROCEDURE — 96374 THER/PROPH/DIAG INJ IV PUSH: CPT

## 2017-01-01 PROCEDURE — 83880 ASSAY OF NATRIURETIC PEPTIDE: CPT | Performed by: EMERGENCY MEDICINE

## 2017-01-01 PROCEDURE — 77030021678 HC GLIDESCP STAT DISP VERT -B: Performed by: ANESTHESIOLOGY

## 2017-01-01 PROCEDURE — 81001 URINALYSIS AUTO W/SCOPE: CPT | Performed by: EMERGENCY MEDICINE

## 2017-01-01 PROCEDURE — 85730 THROMBOPLASTIN TIME PARTIAL: CPT | Performed by: UROLOGY

## 2017-01-01 DEVICE — URETERAL STENT
Type: IMPLANTABLE DEVICE | Site: URETER | Status: FUNCTIONAL
Brand: POLARIS™ ULTRA

## 2017-01-01 RX ORDER — HYDRALAZINE HYDROCHLORIDE 20 MG/ML
INJECTION INTRAMUSCULAR; INTRAVENOUS AS NEEDED
Status: DISCONTINUED | OUTPATIENT
Start: 2017-01-01 | End: 2017-01-01 | Stop reason: HOSPADM

## 2017-01-01 RX ORDER — DEXTROSE 50 % IN WATER (D50W) INTRAVENOUS SYRINGE
25-50 AS NEEDED
Status: DISCONTINUED | OUTPATIENT
Start: 2017-01-01 | End: 2017-01-01 | Stop reason: HOSPADM

## 2017-01-01 RX ORDER — SODIUM CHLORIDE, SODIUM LACTATE, POTASSIUM CHLORIDE, CALCIUM CHLORIDE 600; 310; 30; 20 MG/100ML; MG/100ML; MG/100ML; MG/100ML
25 INJECTION, SOLUTION INTRAVENOUS CONTINUOUS
Status: DISCONTINUED | OUTPATIENT
Start: 2017-01-01 | End: 2017-01-01 | Stop reason: HOSPADM

## 2017-01-01 RX ORDER — INSULIN LISPRO 100 [IU]/ML
INJECTION, SOLUTION INTRAVENOUS; SUBCUTANEOUS ONCE
Status: DISCONTINUED | OUTPATIENT
Start: 2017-01-01 | End: 2017-01-01 | Stop reason: HOSPADM

## 2017-01-01 RX ORDER — FAMOTIDINE 20 MG/1
20 TABLET, FILM COATED ORAL ONCE
Status: COMPLETED | OUTPATIENT
Start: 2017-01-01 | End: 2017-01-01

## 2017-01-01 RX ORDER — CEFAZOLIN SODIUM 2 G/50ML
2 SOLUTION INTRAVENOUS
Status: DISCONTINUED | OUTPATIENT
Start: 2017-01-01 | End: 2017-01-01

## 2017-01-01 RX ORDER — IPRATROPIUM BROMIDE AND ALBUTEROL SULFATE 2.5; .5 MG/3ML; MG/3ML
3 SOLUTION RESPIRATORY (INHALATION)
Status: DISCONTINUED | OUTPATIENT
Start: 2017-01-01 | End: 2017-01-01 | Stop reason: HOSPADM

## 2017-01-01 RX ORDER — PROPOFOL 10 MG/ML
INJECTION, EMULSION INTRAVENOUS AS NEEDED
Status: DISCONTINUED | OUTPATIENT
Start: 2017-01-01 | End: 2017-01-01 | Stop reason: HOSPADM

## 2017-01-01 RX ORDER — FLUTICASONE PROPIONATE AND SALMETEROL 250; 50 UG/1; UG/1
1 POWDER RESPIRATORY (INHALATION) 2 TIMES DAILY
Status: DISCONTINUED | OUTPATIENT
Start: 2017-01-01 | End: 2017-01-01 | Stop reason: CLARIF

## 2017-01-01 RX ORDER — LIDOCAINE HYDROCHLORIDE 10 MG/ML
0.1 INJECTION, SOLUTION EPIDURAL; INFILTRATION; INTRACAUDAL; PERINEURAL AS NEEDED
Status: DISCONTINUED | OUTPATIENT
Start: 2017-01-01 | End: 2017-01-01 | Stop reason: HOSPADM

## 2017-01-01 RX ORDER — HEPARIN SODIUM 5000 [USP'U]/ML
5000 INJECTION, SOLUTION INTRAVENOUS; SUBCUTANEOUS EVERY 8 HOURS
Status: DISCONTINUED | OUTPATIENT
Start: 2017-01-01 | End: 2017-01-01

## 2017-01-01 RX ORDER — FENTANYL CITRATE 50 UG/ML
INJECTION, SOLUTION INTRAMUSCULAR; INTRAVENOUS AS NEEDED
Status: DISCONTINUED | OUTPATIENT
Start: 2017-01-01 | End: 2017-01-01 | Stop reason: HOSPADM

## 2017-01-01 RX ORDER — ONDANSETRON 2 MG/ML
4 INJECTION INTRAMUSCULAR; INTRAVENOUS ONCE
Status: DISCONTINUED | OUTPATIENT
Start: 2017-01-01 | End: 2017-01-01 | Stop reason: HOSPADM

## 2017-01-01 RX ORDER — METOPROLOL TARTRATE 50 MG/1
50 TABLET ORAL 2 TIMES DAILY
Status: DISCONTINUED | OUTPATIENT
Start: 2017-01-01 | End: 2017-01-01 | Stop reason: HOSPADM

## 2017-01-01 RX ORDER — IPRATROPIUM BROMIDE AND ALBUTEROL SULFATE 2.5; .5 MG/3ML; MG/3ML
SOLUTION RESPIRATORY (INHALATION)
Status: COMPLETED
Start: 2017-01-01 | End: 2017-01-01

## 2017-01-01 RX ORDER — ZOLPIDEM TARTRATE 5 MG/1
5 TABLET ORAL
Status: DISCONTINUED | OUTPATIENT
Start: 2017-01-01 | End: 2017-01-01

## 2017-01-01 RX ORDER — FAMOTIDINE 20 MG/1
20 TABLET, FILM COATED ORAL ONCE
Status: DISCONTINUED | OUTPATIENT
Start: 2017-01-01 | End: 2017-01-01 | Stop reason: HOSPADM

## 2017-01-01 RX ORDER — SUCCINYLCHOLINE CHLORIDE 20 MG/ML
INJECTION INTRAMUSCULAR; INTRAVENOUS AS NEEDED
Status: DISCONTINUED | OUTPATIENT
Start: 2017-01-01 | End: 2017-01-01 | Stop reason: HOSPADM

## 2017-01-01 RX ORDER — SODIUM CHLORIDE 0.9 % (FLUSH) 0.9 %
5-10 SYRINGE (ML) INJECTION EVERY 8 HOURS
Status: DISCONTINUED | OUTPATIENT
Start: 2017-01-01 | End: 2017-01-01 | Stop reason: HOSPADM

## 2017-01-01 RX ORDER — GENTAMICIN SULFATE 40 MG/ML
INJECTION, SOLUTION INTRAMUSCULAR; INTRAVENOUS AS NEEDED
Status: DISCONTINUED | OUTPATIENT
Start: 2017-01-01 | End: 2017-01-01 | Stop reason: HOSPADM

## 2017-01-01 RX ORDER — OXYCODONE AND ACETAMINOPHEN 7.5; 325 MG/1; MG/1
1 TABLET ORAL AS NEEDED
Status: DISCONTINUED | OUTPATIENT
Start: 2017-01-01 | End: 2017-01-01 | Stop reason: HOSPADM

## 2017-01-01 RX ORDER — GLYCOPYRROLATE 0.2 MG/ML
INJECTION INTRAMUSCULAR; INTRAVENOUS AS NEEDED
Status: DISCONTINUED | OUTPATIENT
Start: 2017-01-01 | End: 2017-01-01 | Stop reason: HOSPADM

## 2017-01-01 RX ORDER — MAGNESIUM SULFATE 100 %
4 CRYSTALS MISCELLANEOUS AS NEEDED
Status: DISCONTINUED | OUTPATIENT
Start: 2017-01-01 | End: 2017-01-01 | Stop reason: HOSPADM

## 2017-01-01 RX ORDER — SODIUM CHLORIDE 0.9 % (FLUSH) 0.9 %
5-10 SYRINGE (ML) INJECTION AS NEEDED
Status: DISCONTINUED | OUTPATIENT
Start: 2017-01-01 | End: 2017-01-01 | Stop reason: HOSPADM

## 2017-01-01 RX ORDER — LIDOCAINE HYDROCHLORIDE 20 MG/ML
INJECTION, SOLUTION EPIDURAL; INFILTRATION; INTRACAUDAL; PERINEURAL AS NEEDED
Status: DISCONTINUED | OUTPATIENT
Start: 2017-01-01 | End: 2017-01-01 | Stop reason: HOSPADM

## 2017-01-01 RX ORDER — AMOXICILLIN AND CLAVULANATE POTASSIUM 875; 125 MG/1; MG/1
1 TABLET, FILM COATED ORAL 2 TIMES DAILY
Qty: 10 TAB | Refills: 0 | Status: SHIPPED | OUTPATIENT
Start: 2017-01-01 | End: 2017-01-01

## 2017-01-01 RX ORDER — FUROSEMIDE 40 MG/1
40 TABLET ORAL DAILY
Status: DISCONTINUED | OUTPATIENT
Start: 2017-01-01 | End: 2017-01-01 | Stop reason: HOSPADM

## 2017-01-01 RX ORDER — HALOPERIDOL 5 MG/ML
2.5 INJECTION INTRAMUSCULAR
Status: DISPENSED | OUTPATIENT
Start: 2017-01-01 | End: 2017-01-01

## 2017-01-01 RX ORDER — INSULIN LISPRO 100 [IU]/ML
INJECTION, SOLUTION INTRAVENOUS; SUBCUTANEOUS
Status: DISCONTINUED | OUTPATIENT
Start: 2017-01-01 | End: 2017-01-01 | Stop reason: HOSPADM

## 2017-01-01 RX ORDER — BUDESONIDE 0.5 MG/2ML
500 INHALANT ORAL
Status: DISCONTINUED | OUTPATIENT
Start: 2017-01-01 | End: 2017-01-01 | Stop reason: HOSPADM

## 2017-01-01 RX ORDER — FUROSEMIDE 10 MG/ML
40 INJECTION INTRAMUSCULAR; INTRAVENOUS
Status: COMPLETED | OUTPATIENT
Start: 2017-01-01 | End: 2017-01-01

## 2017-01-01 RX ORDER — FENTANYL CITRATE 50 UG/ML
50 INJECTION, SOLUTION INTRAMUSCULAR; INTRAVENOUS
Status: DISCONTINUED | OUTPATIENT
Start: 2017-01-01 | End: 2017-01-01 | Stop reason: HOSPADM

## 2017-01-01 RX ORDER — GABAPENTIN 300 MG/1
300 CAPSULE ORAL 2 TIMES DAILY
Status: DISCONTINUED | OUTPATIENT
Start: 2017-01-01 | End: 2017-01-01 | Stop reason: HOSPADM

## 2017-01-01 RX ORDER — AMOXICILLIN 500 MG/1
500 CAPSULE ORAL 3 TIMES DAILY
Qty: 15 CAP | Refills: 0 | Status: SHIPPED | OUTPATIENT
Start: 2017-01-01 | End: 2017-01-01

## 2017-01-01 RX ORDER — AMLODIPINE BESYLATE 5 MG/1
5 TABLET ORAL DAILY
Status: DISCONTINUED | OUTPATIENT
Start: 2017-01-01 | End: 2017-01-01 | Stop reason: HOSPADM

## 2017-01-01 RX ORDER — FENTANYL CITRATE 50 UG/ML
50 INJECTION, SOLUTION INTRAMUSCULAR; INTRAVENOUS AS NEEDED
Status: DISCONTINUED | OUTPATIENT
Start: 2017-01-01 | End: 2017-01-01 | Stop reason: HOSPADM

## 2017-01-01 RX ORDER — PANTOPRAZOLE SODIUM 40 MG/1
40 TABLET, DELAYED RELEASE ORAL
Status: DISCONTINUED | OUTPATIENT
Start: 2017-01-01 | End: 2017-01-01 | Stop reason: HOSPADM

## 2017-01-01 RX ORDER — ACETAMINOPHEN 325 MG/1
650 TABLET ORAL
Status: DISCONTINUED | OUTPATIENT
Start: 2017-01-01 | End: 2017-01-01 | Stop reason: HOSPADM

## 2017-01-01 RX ORDER — POTASSIUM CHLORIDE 20 MEQ/1
1 TABLET, EXTENDED RELEASE ORAL DAILY
COMMUNITY
Start: 2017-01-01

## 2017-01-01 RX ORDER — ONDANSETRON 2 MG/ML
INJECTION INTRAMUSCULAR; INTRAVENOUS AS NEEDED
Status: DISCONTINUED | OUTPATIENT
Start: 2017-01-01 | End: 2017-01-01 | Stop reason: HOSPADM

## 2017-01-01 RX ORDER — NEOSTIGMINE METHYLSULFATE 5 MG/5 ML
SYRINGE (ML) INTRAVENOUS AS NEEDED
Status: DISCONTINUED | OUTPATIENT
Start: 2017-01-01 | End: 2017-01-01 | Stop reason: HOSPADM

## 2017-01-01 RX ORDER — OXYCODONE AND ACETAMINOPHEN 5; 325 MG/1; MG/1
1 TABLET ORAL
Status: DISCONTINUED | OUTPATIENT
Start: 2017-01-01 | End: 2017-01-01 | Stop reason: HOSPADM

## 2017-01-01 RX ORDER — SODIUM CHLORIDE 9 MG/ML
100 INJECTION, SOLUTION INTRAVENOUS CONTINUOUS
Status: DISCONTINUED | OUTPATIENT
Start: 2017-01-01 | End: 2017-01-01

## 2017-01-01 RX ORDER — FUROSEMIDE 10 MG/ML
40 INJECTION INTRAMUSCULAR; INTRAVENOUS
Status: DISCONTINUED | OUTPATIENT
Start: 2017-01-01 | End: 2017-01-01 | Stop reason: HOSPADM

## 2017-01-01 RX ORDER — SODIUM CHLORIDE 9 MG/ML
30 INJECTION, SOLUTION INTRAVENOUS ONCE
Status: COMPLETED | OUTPATIENT
Start: 2017-01-01 | End: 2017-01-01

## 2017-01-01 RX ORDER — VECURONIUM BROMIDE FOR INJECTION 1 MG/ML
INJECTION, POWDER, LYOPHILIZED, FOR SOLUTION INTRAVENOUS AS NEEDED
Status: DISCONTINUED | OUTPATIENT
Start: 2017-01-01 | End: 2017-01-01 | Stop reason: HOSPADM

## 2017-01-01 RX ORDER — POTASSIUM CHLORIDE 20 MEQ/1
40 TABLET, EXTENDED RELEASE ORAL
Status: COMPLETED | OUTPATIENT
Start: 2017-01-01 | End: 2017-01-01

## 2017-01-01 RX ORDER — SODIUM CHLORIDE, SODIUM LACTATE, POTASSIUM CHLORIDE, CALCIUM CHLORIDE 600; 310; 30; 20 MG/100ML; MG/100ML; MG/100ML; MG/100ML
50 INJECTION, SOLUTION INTRAVENOUS CONTINUOUS
Status: DISCONTINUED | OUTPATIENT
Start: 2017-01-01 | End: 2017-01-01 | Stop reason: HOSPADM

## 2017-01-01 RX ORDER — LEVOFLOXACIN 750 MG/1
750 TABLET ORAL DAILY
Qty: 5 TAB | Refills: 0 | Status: SHIPPED | OUTPATIENT
Start: 2017-01-01 | End: 2017-01-01

## 2017-01-01 RX ORDER — ARFORMOTEROL TARTRATE 15 UG/2ML
15 SOLUTION RESPIRATORY (INHALATION)
Status: DISCONTINUED | OUTPATIENT
Start: 2017-01-01 | End: 2017-01-01 | Stop reason: HOSPADM

## 2017-01-01 RX ORDER — WARFARIN 3 MG/1
6 TABLET ORAL DAILY
Status: DISCONTINUED | OUTPATIENT
Start: 2017-01-01 | End: 2017-01-01 | Stop reason: HOSPADM

## 2017-01-01 RX ORDER — CEFAZOLIN SODIUM 2 G/50ML
2 SOLUTION INTRAVENOUS
Status: COMPLETED | OUTPATIENT
Start: 2017-01-01 | End: 2017-01-01

## 2017-01-01 RX ORDER — DIPHENHYDRAMINE HYDROCHLORIDE 50 MG/ML
12.5 INJECTION, SOLUTION INTRAMUSCULAR; INTRAVENOUS ONCE
Status: DISPENSED | OUTPATIENT
Start: 2017-01-01 | End: 2017-01-01

## 2017-01-01 RX ORDER — FAMOTIDINE 20 MG/1
TABLET, FILM COATED ORAL
Status: DISCONTINUED
Start: 2017-01-01 | End: 2017-01-01 | Stop reason: HOSPADM

## 2017-01-01 RX ORDER — PRAVASTATIN SODIUM 20 MG/1
40 TABLET ORAL
Status: DISCONTINUED | OUTPATIENT
Start: 2017-01-01 | End: 2017-01-01 | Stop reason: HOSPADM

## 2017-01-01 RX ORDER — ASPIRIN 81 MG/1
81 TABLET ORAL DAILY
Status: DISCONTINUED | OUTPATIENT
Start: 2017-01-01 | End: 2017-01-01 | Stop reason: HOSPADM

## 2017-01-01 RX ADMIN — HEPARIN SODIUM 5000 UNITS: 5000 INJECTION, SOLUTION INTRAVENOUS; SUBCUTANEOUS at 13:37

## 2017-01-01 RX ADMIN — SODIUM CHLORIDE 500 MG: 900 INJECTION, SOLUTION INTRAVENOUS at 10:16

## 2017-01-01 RX ADMIN — INSULIN DETEMIR 23 UNITS: 100 INJECTION, SOLUTION SUBCUTANEOUS at 21:23

## 2017-01-01 RX ADMIN — HEPARIN SODIUM 5000 UNITS: 5000 INJECTION, SOLUTION INTRAVENOUS; SUBCUTANEOUS at 20:02

## 2017-01-01 RX ADMIN — CEFTRIAXONE 2 G: 2 INJECTION, POWDER, FOR SOLUTION INTRAMUSCULAR; INTRAVENOUS at 09:38

## 2017-01-01 RX ADMIN — IPRATROPIUM BROMIDE AND ALBUTEROL SULFATE 3 ML: .5; 3 SOLUTION RESPIRATORY (INHALATION) at 20:47

## 2017-01-01 RX ADMIN — METOPROLOL TARTRATE 50 MG: 50 TABLET ORAL at 18:58

## 2017-01-01 RX ADMIN — WARFARIN SODIUM 6 MG: 3 TABLET ORAL at 09:42

## 2017-01-01 RX ADMIN — IPRATROPIUM BROMIDE AND ALBUTEROL SULFATE 3 ML: .5; 3 SOLUTION RESPIRATORY (INHALATION) at 20:27

## 2017-01-01 RX ADMIN — GABAPENTIN 300 MG: 300 CAPSULE ORAL at 18:58

## 2017-01-01 RX ADMIN — GABAPENTIN 300 MG: 300 CAPSULE ORAL at 18:01

## 2017-01-01 RX ADMIN — METOPROLOL TARTRATE 50 MG: 50 TABLET ORAL at 18:01

## 2017-01-01 RX ADMIN — PANTOPRAZOLE SODIUM 40 MG: 40 TABLET, DELAYED RELEASE ORAL at 08:30

## 2017-01-01 RX ADMIN — ACETAMINOPHEN 650 MG: 325 TABLET, FILM COATED ORAL at 00:05

## 2017-01-01 RX ADMIN — WARFARIN SODIUM 6 MG: 3 TABLET ORAL at 09:26

## 2017-01-01 RX ADMIN — IPRATROPIUM BROMIDE AND ALBUTEROL SULFATE 3 ML: .5; 3 SOLUTION RESPIRATORY (INHALATION) at 02:46

## 2017-01-01 RX ADMIN — FENTANYL CITRATE 100 MCG: 50 INJECTION, SOLUTION INTRAMUSCULAR; INTRAVENOUS at 13:49

## 2017-01-01 RX ADMIN — FAMOTIDINE 20 MG: 20 TABLET ORAL at 13:00

## 2017-01-01 RX ADMIN — IPRATROPIUM BROMIDE AND ALBUTEROL SULFATE 3 ML: .5; 3 SOLUTION RESPIRATORY (INHALATION) at 01:03

## 2017-01-01 RX ADMIN — VECURONIUM BROMIDE FOR INJECTION 1 MG: 1 INJECTION, POWDER, LYOPHILIZED, FOR SOLUTION INTRAVENOUS at 14:06

## 2017-01-01 RX ADMIN — ASPIRIN 81 MG: 81 TABLET, COATED ORAL at 10:17

## 2017-01-01 RX ADMIN — PRAVASTATIN SODIUM 40 MG: 20 TABLET ORAL at 21:14

## 2017-01-01 RX ADMIN — HYDRALAZINE HYDROCHLORIDE 4 MG: 20 INJECTION INTRAMUSCULAR; INTRAVENOUS at 15:03

## 2017-01-01 RX ADMIN — FUROSEMIDE 40 MG: 40 TABLET ORAL at 11:16

## 2017-01-01 RX ADMIN — LIDOCAINE HYDROCHLORIDE 50 MG: 20 INJECTION, SOLUTION EPIDURAL; INFILTRATION; INTRACAUDAL; PERINEURAL at 13:49

## 2017-01-01 RX ADMIN — CEFTRIAXONE 2 G: 2 INJECTION, POWDER, FOR SOLUTION INTRAMUSCULAR; INTRAVENOUS at 09:20

## 2017-01-01 RX ADMIN — INSULIN LISPRO 2 UNITS: 100 INJECTION, SOLUTION INTRAVENOUS; SUBCUTANEOUS at 19:59

## 2017-01-01 RX ADMIN — CEFTRIAXONE 2 G: 2 INJECTION, POWDER, FOR SOLUTION INTRAMUSCULAR; INTRAVENOUS at 09:23

## 2017-01-01 RX ADMIN — METOPROLOL TARTRATE 50 MG: 50 TABLET ORAL at 10:17

## 2017-01-01 RX ADMIN — HEPARIN SODIUM 5000 UNITS: 5000 INJECTION, SOLUTION INTRAVENOUS; SUBCUTANEOUS at 02:15

## 2017-01-01 RX ADMIN — POTASSIUM CHLORIDE 40 MEQ: 20 TABLET, EXTENDED RELEASE ORAL at 09:20

## 2017-01-01 RX ADMIN — FUROSEMIDE 40 MG: 40 TABLET ORAL at 09:41

## 2017-01-01 RX ADMIN — SODIUM CHLORIDE, SODIUM LACTATE, POTASSIUM CHLORIDE, AND CALCIUM CHLORIDE 25 ML/HR: 600; 310; 30; 20 INJECTION, SOLUTION INTRAVENOUS at 16:20

## 2017-01-01 RX ADMIN — INSULIN LISPRO 2 UNITS: 100 INJECTION, SOLUTION INTRAVENOUS; SUBCUTANEOUS at 22:01

## 2017-01-01 RX ADMIN — IPRATROPIUM BROMIDE AND ALBUTEROL SULFATE 3 ML: .5; 3 SOLUTION RESPIRATORY (INHALATION) at 07:17

## 2017-01-01 RX ADMIN — INSULIN LISPRO 2 UNITS: 100 INJECTION, SOLUTION INTRAVENOUS; SUBCUTANEOUS at 13:36

## 2017-01-01 RX ADMIN — METOPROLOL TARTRATE 50 MG: 50 TABLET ORAL at 20:04

## 2017-01-01 RX ADMIN — IPRATROPIUM BROMIDE AND ALBUTEROL SULFATE 3 ML: .5; 3 SOLUTION RESPIRATORY (INHALATION) at 14:58

## 2017-01-01 RX ADMIN — METOPROLOL TARTRATE 50 MG: 50 TABLET ORAL at 17:28

## 2017-01-01 RX ADMIN — GABAPENTIN 300 MG: 300 CAPSULE ORAL at 11:16

## 2017-01-01 RX ADMIN — PRAVASTATIN SODIUM 40 MG: 20 TABLET ORAL at 21:24

## 2017-01-01 RX ADMIN — VECURONIUM BROMIDE FOR INJECTION 1 MG: 1 INJECTION, POWDER, LYOPHILIZED, FOR SOLUTION INTRAVENOUS at 14:21

## 2017-01-01 RX ADMIN — PRAVASTATIN SODIUM 40 MG: 20 TABLET ORAL at 21:15

## 2017-01-01 RX ADMIN — PROPOFOL 150 MG: 10 INJECTION, EMULSION INTRAVENOUS at 13:49

## 2017-01-01 RX ADMIN — BUDESONIDE 500 MCG: 0.5 INHALANT RESPIRATORY (INHALATION) at 09:24

## 2017-01-01 RX ADMIN — SODIUM CHLORIDE, SODIUM LACTATE, POTASSIUM CHLORIDE, AND CALCIUM CHLORIDE 25 ML/HR: 600; 310; 30; 20 INJECTION, SOLUTION INTRAVENOUS at 11:25

## 2017-01-01 RX ADMIN — GABAPENTIN 300 MG: 300 CAPSULE ORAL at 09:33

## 2017-01-01 RX ADMIN — PANTOPRAZOLE SODIUM 40 MG: 40 TABLET, DELAYED RELEASE ORAL at 09:26

## 2017-01-01 RX ADMIN — SODIUM CHLORIDE 1500 MG: 900 INJECTION, SOLUTION INTRAVENOUS at 11:50

## 2017-01-01 RX ADMIN — INSULIN DETEMIR 23 UNITS: 100 INJECTION, SOLUTION SUBCUTANEOUS at 17:56

## 2017-01-01 RX ADMIN — ARFORMOTEROL TARTRATE 15 MCG: 15 SOLUTION RESPIRATORY (INHALATION) at 21:32

## 2017-01-01 RX ADMIN — GABAPENTIN 300 MG: 300 CAPSULE ORAL at 09:25

## 2017-01-01 RX ADMIN — VECURONIUM BROMIDE FOR INJECTION 2 MG: 1 INJECTION, POWDER, LYOPHILIZED, FOR SOLUTION INTRAVENOUS at 13:56

## 2017-01-01 RX ADMIN — Medication 5 MG: at 14:47

## 2017-01-01 RX ADMIN — ARFORMOTEROL TARTRATE 15 MCG: 15 SOLUTION RESPIRATORY (INHALATION) at 20:27

## 2017-01-01 RX ADMIN — BUDESONIDE 500 MCG: 0.5 INHALANT RESPIRATORY (INHALATION) at 20:46

## 2017-01-01 RX ADMIN — GABAPENTIN 300 MG: 300 CAPSULE ORAL at 20:08

## 2017-01-01 RX ADMIN — INSULIN DETEMIR 23 UNITS: 100 INJECTION, SOLUTION SUBCUTANEOUS at 19:58

## 2017-01-01 RX ADMIN — AMLODIPINE BESYLATE 5 MG: 5 TABLET ORAL at 09:26

## 2017-01-01 RX ADMIN — METOPROLOL TARTRATE 50 MG: 50 TABLET ORAL at 09:26

## 2017-01-01 RX ADMIN — IPRATROPIUM BROMIDE AND ALBUTEROL SULFATE: .5; 3 SOLUTION RESPIRATORY (INHALATION) at 12:03

## 2017-01-01 RX ADMIN — ASPIRIN 81 MG: 81 TABLET, COATED ORAL at 09:42

## 2017-01-01 RX ADMIN — GABAPENTIN 300 MG: 300 CAPSULE ORAL at 21:10

## 2017-01-01 RX ADMIN — HEPARIN SODIUM 5000 UNITS: 5000 INJECTION, SOLUTION INTRAVENOUS; SUBCUTANEOUS at 13:01

## 2017-01-01 RX ADMIN — GABAPENTIN 300 MG: 300 CAPSULE ORAL at 17:26

## 2017-01-01 RX ADMIN — FUROSEMIDE 40 MG: 40 TABLET ORAL at 10:17

## 2017-01-01 RX ADMIN — PRAVASTATIN SODIUM 40 MG: 20 TABLET ORAL at 21:00

## 2017-01-01 RX ADMIN — HEPARIN SODIUM 5000 UNITS: 5000 INJECTION, SOLUTION INTRAVENOUS; SUBCUTANEOUS at 11:18

## 2017-01-01 RX ADMIN — CEFTRIAXONE 2 G: 2 INJECTION, POWDER, FOR SOLUTION INTRAMUSCULAR; INTRAVENOUS at 11:21

## 2017-01-01 RX ADMIN — ARFORMOTEROL TARTRATE 15 MCG: 15 SOLUTION RESPIRATORY (INHALATION) at 09:24

## 2017-01-01 RX ADMIN — WARFARIN SODIUM 6 MG: 3 TABLET ORAL at 10:17

## 2017-01-01 RX ADMIN — ARFORMOTEROL TARTRATE 15 MCG: 15 SOLUTION RESPIRATORY (INHALATION) at 08:30

## 2017-01-01 RX ADMIN — ARFORMOTEROL TARTRATE 15 MCG: 15 SOLUTION RESPIRATORY (INHALATION) at 21:00

## 2017-01-01 RX ADMIN — PANTOPRAZOLE SODIUM 40 MG: 40 TABLET, DELAYED RELEASE ORAL at 10:17

## 2017-01-01 RX ADMIN — ONDANSETRON 4 MG: 2 INJECTION INTRAMUSCULAR; INTRAVENOUS at 14:03

## 2017-01-01 RX ADMIN — IPRATROPIUM BROMIDE AND ALBUTEROL SULFATE 3 ML: .5; 3 SOLUTION RESPIRATORY (INHALATION) at 08:00

## 2017-01-01 RX ADMIN — PANTOPRAZOLE SODIUM 40 MG: 40 TABLET, DELAYED RELEASE ORAL at 09:33

## 2017-01-01 RX ADMIN — ARFORMOTEROL TARTRATE 15 MCG: 15 SOLUTION RESPIRATORY (INHALATION) at 07:17

## 2017-01-01 RX ADMIN — IPRATROPIUM BROMIDE AND ALBUTEROL SULFATE 3 ML: .5; 3 SOLUTION RESPIRATORY (INHALATION) at 02:23

## 2017-01-01 RX ADMIN — ASPIRIN 81 MG: 81 TABLET, COATED ORAL at 09:26

## 2017-01-01 RX ADMIN — FUROSEMIDE 40 MG: 40 TABLET ORAL at 09:32

## 2017-01-01 RX ADMIN — ASPIRIN 81 MG: 81 TABLET, COATED ORAL at 11:15

## 2017-01-01 RX ADMIN — BUDESONIDE 500 MCG: 0.5 INHALANT RESPIRATORY (INHALATION) at 20:27

## 2017-01-01 RX ADMIN — BUDESONIDE 500 MCG: 0.5 INHALANT RESPIRATORY (INHALATION) at 19:59

## 2017-01-01 RX ADMIN — IPRATROPIUM BROMIDE AND ALBUTEROL SULFATE 3 ML: .5; 3 SOLUTION RESPIRATORY (INHALATION) at 21:32

## 2017-01-01 RX ADMIN — IPRATROPIUM BROMIDE AND ALBUTEROL SULFATE 3 ML: .5; 3 SOLUTION RESPIRATORY (INHALATION) at 08:15

## 2017-01-01 RX ADMIN — GABAPENTIN 300 MG: 300 CAPSULE ORAL at 18:46

## 2017-01-01 RX ADMIN — HEPARIN SODIUM 5000 UNITS: 5000 INJECTION, SOLUTION INTRAVENOUS; SUBCUTANEOUS at 04:34

## 2017-01-01 RX ADMIN — CEFAZOLIN SODIUM 2 G: 2 SOLUTION INTRAVENOUS at 13:50

## 2017-01-01 RX ADMIN — SODIUM CHLORIDE 500 MG: 900 INJECTION, SOLUTION INTRAVENOUS at 12:54

## 2017-01-01 RX ADMIN — ASPIRIN 81 MG: 81 TABLET, COATED ORAL at 09:33

## 2017-01-01 RX ADMIN — BUDESONIDE 500 MCG: 0.5 INHALANT RESPIRATORY (INHALATION) at 21:32

## 2017-01-01 RX ADMIN — METOPROLOL TARTRATE 50 MG: 50 TABLET ORAL at 09:33

## 2017-01-01 RX ADMIN — WARFARIN SODIUM 6 MG: 3 TABLET ORAL at 09:33

## 2017-01-01 RX ADMIN — HEPARIN SODIUM 5000 UNITS: 5000 INJECTION, SOLUTION INTRAVENOUS; SUBCUTANEOUS at 02:24

## 2017-01-01 RX ADMIN — BUDESONIDE 500 MCG: 0.5 INHALANT RESPIRATORY (INHALATION) at 07:17

## 2017-01-01 RX ADMIN — SODIUM CHLORIDE 500 MG: 900 INJECTION, SOLUTION INTRAVENOUS at 10:15

## 2017-01-01 RX ADMIN — INSULIN LISPRO 2 UNITS: 100 INJECTION, SOLUTION INTRAVENOUS; SUBCUTANEOUS at 17:56

## 2017-01-01 RX ADMIN — GABAPENTIN 300 MG: 300 CAPSULE ORAL at 10:17

## 2017-01-01 RX ADMIN — SUCCINYLCHOLINE CHLORIDE 120 MG: 20 INJECTION INTRAMUSCULAR; INTRAVENOUS at 13:49

## 2017-01-01 RX ADMIN — GLYCOPYRROLATE 0.6 MG: 0.2 INJECTION INTRAMUSCULAR; INTRAVENOUS at 14:47

## 2017-01-01 RX ADMIN — ARFORMOTEROL TARTRATE 15 MCG: 15 SOLUTION RESPIRATORY (INHALATION) at 19:58

## 2017-01-01 RX ADMIN — AMLODIPINE BESYLATE 5 MG: 5 TABLET ORAL at 09:33

## 2017-01-01 RX ADMIN — AMLODIPINE BESYLATE 5 MG: 5 TABLET ORAL at 11:15

## 2017-01-01 RX ADMIN — SODIUM CHLORIDE 500 MG: 900 INJECTION, SOLUTION INTRAVENOUS at 10:45

## 2017-01-01 RX ADMIN — SUCCINYLCHOLINE CHLORIDE 100 MG: 20 INJECTION INTRAMUSCULAR; INTRAVENOUS at 12:47

## 2017-01-01 RX ADMIN — HEPARIN SODIUM 5000 UNITS: 5000 INJECTION, SOLUTION INTRAVENOUS; SUBCUTANEOUS at 03:07

## 2017-01-01 RX ADMIN — METOPROLOL TARTRATE 50 MG: 50 TABLET ORAL at 09:43

## 2017-01-01 RX ADMIN — AMLODIPINE BESYLATE 5 MG: 5 TABLET ORAL at 09:42

## 2017-01-01 RX ADMIN — BUDESONIDE 500 MCG: 0.5 INHALANT RESPIRATORY (INHALATION) at 20:48

## 2017-01-01 RX ADMIN — FENTANYL CITRATE 50 MCG: 50 INJECTION, SOLUTION INTRAMUSCULAR; INTRAVENOUS at 12:31

## 2017-01-01 RX ADMIN — METOPROLOL TARTRATE 50 MG: 50 TABLET ORAL at 11:15

## 2017-01-01 RX ADMIN — IPRATROPIUM BROMIDE AND ALBUTEROL SULFATE 3 ML: .5; 3 SOLUTION RESPIRATORY (INHALATION) at 01:42

## 2017-01-01 RX ADMIN — SODIUM CHLORIDE, SODIUM LACTATE, POTASSIUM CHLORIDE, AND CALCIUM CHLORIDE 50 ML/HR: 600; 310; 30; 20 INJECTION, SOLUTION INTRAVENOUS at 14:39

## 2017-01-01 RX ADMIN — INSULIN LISPRO 2 UNITS: 100 INJECTION, SOLUTION INTRAVENOUS; SUBCUTANEOUS at 14:08

## 2017-01-01 RX ADMIN — SODIUM CHLORIDE 500 MG: 900 INJECTION, SOLUTION INTRAVENOUS at 11:20

## 2017-01-01 RX ADMIN — AMLODIPINE BESYLATE 5 MG: 5 TABLET ORAL at 10:17

## 2017-01-01 RX ADMIN — IOPAMIDOL 72 ML: 612 INJECTION, SOLUTION INTRAVENOUS at 19:23

## 2017-01-01 RX ADMIN — CEFTRIAXONE 2 G: 2 INJECTION, POWDER, FOR SOLUTION INTRAMUSCULAR; INTRAVENOUS at 10:16

## 2017-01-01 RX ADMIN — SODIUM CHLORIDE, SODIUM LACTATE, POTASSIUM CHLORIDE, AND CALCIUM CHLORIDE 50 ML/HR: 600; 310; 30; 20 INJECTION, SOLUTION INTRAVENOUS at 12:57

## 2017-01-01 RX ADMIN — METOPROLOL TARTRATE 50 MG: 50 TABLET ORAL at 21:09

## 2017-01-01 RX ADMIN — PRAVASTATIN SODIUM 40 MG: 20 TABLET ORAL at 23:06

## 2017-01-01 RX ADMIN — ARFORMOTEROL TARTRATE 15 MCG: 15 SOLUTION RESPIRATORY (INHALATION) at 20:46

## 2017-01-01 RX ADMIN — IPRATROPIUM BROMIDE AND ALBUTEROL SULFATE 3 ML: .5; 3 SOLUTION RESPIRATORY (INHALATION) at 20:48

## 2017-01-01 RX ADMIN — HEPARIN SODIUM 5000 UNITS: 5000 INJECTION, SOLUTION INTRAVENOUS; SUBCUTANEOUS at 11:00

## 2017-01-01 RX ADMIN — CEFTRIAXONE 2 G: 2 INJECTION, POWDER, FOR SOLUTION INTRAMUSCULAR; INTRAVENOUS at 12:38

## 2017-01-01 RX ADMIN — HEPARIN SODIUM 5000 UNITS: 5000 INJECTION, SOLUTION INTRAVENOUS; SUBCUTANEOUS at 21:11

## 2017-01-01 RX ADMIN — IPRATROPIUM BROMIDE AND ALBUTEROL SULFATE 3 ML: .5; 3 SOLUTION RESPIRATORY (INHALATION) at 02:05

## 2017-01-01 RX ADMIN — SODIUM CHLORIDE 500 MG: 900 INJECTION, SOLUTION INTRAVENOUS at 11:10

## 2017-01-01 RX ADMIN — LIDOCAINE HYDROCHLORIDE 30 MG: 20 INJECTION, SOLUTION EPIDURAL; INFILTRATION; INTRACAUDAL; PERINEURAL at 12:35

## 2017-01-01 RX ADMIN — BUDESONIDE 500 MCG: 0.5 INHALANT RESPIRATORY (INHALATION) at 08:15

## 2017-01-01 RX ADMIN — PROPOFOL 150 MG: 10 INJECTION, EMULSION INTRAVENOUS at 12:46

## 2017-01-01 RX ADMIN — VECURONIUM BROMIDE FOR INJECTION 7 MG: 1 INJECTION, POWDER, LYOPHILIZED, FOR SOLUTION INTRAVENOUS at 12:50

## 2017-01-01 RX ADMIN — IPRATROPIUM BROMIDE AND ALBUTEROL SULFATE 3 ML: .5; 3 SOLUTION RESPIRATORY (INHALATION) at 13:05

## 2017-01-01 RX ADMIN — INSULIN DETEMIR 23 UNITS: 100 INJECTION, SOLUTION SUBCUTANEOUS at 18:44

## 2017-01-01 RX ADMIN — FENTANYL CITRATE 25 MCG: 50 INJECTION, SOLUTION INTRAMUSCULAR; INTRAVENOUS at 15:06

## 2017-01-01 RX ADMIN — FUROSEMIDE 40 MG: 40 TABLET ORAL at 09:26

## 2017-01-01 RX ADMIN — HEPARIN SODIUM 5000 UNITS: 5000 INJECTION, SOLUTION INTRAVENOUS; SUBCUTANEOUS at 20:58

## 2017-01-01 RX ADMIN — WARFARIN SODIUM 6 MG: 3 TABLET ORAL at 11:17

## 2017-01-01 RX ADMIN — PANTOPRAZOLE SODIUM 40 MG: 40 TABLET, DELAYED RELEASE ORAL at 08:27

## 2017-01-01 RX ADMIN — SODIUM CHLORIDE 3402 ML: 9 INJECTION, SOLUTION INTRAVENOUS at 08:52

## 2017-01-01 RX ADMIN — FUROSEMIDE 40 MG: 10 INJECTION, SOLUTION INTRAMUSCULAR; INTRAVENOUS at 17:22

## 2017-01-01 RX ADMIN — GABAPENTIN 300 MG: 300 CAPSULE ORAL at 09:41

## 2017-01-01 RX ADMIN — VECURONIUM BROMIDE FOR INJECTION 1 MG: 1 INJECTION, POWDER, LYOPHILIZED, FOR SOLUTION INTRAVENOUS at 14:10

## 2017-03-23 PROBLEM — J18.9 PNEUMONIA: Status: ACTIVE | Noted: 2017-01-01

## 2017-03-23 NOTE — IP AVS SNAPSHOT
Arianna Doyle 
 
 
 [address] [place] [address] 
[phone] Patient: Bina Salinas MRN: [id number] FREDDIE:[id number] You are allergic to the following No active allergies Recent Documentation Height Weight BMI Smoking Status 1.727 m 113 kg 37.88 kg/m2 Former Smoker Unresulted Labs Order Current Status CULTURE, BLOOD Preliminary result CULTURE, BLOOD Preliminary result Emergency Contacts Name Discharge Info Relation Home Work Mobile Roberto Carlos Rothman  Child [2] [phone] Adryna Preston  Child [2] [phone] About your hospitalization You were admitted on:  March 23, 2017 You last received care in the:  [address] NEURO [facility] You were discharged on:  March 28, 2017 Unit phone number:  [phone] Why you were hospitalized Your primary diagnosis was:  Pneumonia Your diagnoses also included:  Chronic A-Fib (Hcc), Cad (Coronary Artery Disease), Htn (Hypertension), Dm (Diabetes Mellitus) (Hcc), Hyperlipidemia, Bladder Cancer (Hcc) Providers Seen During Your Hospitalizations Provider Role Specialty Primary office phone Amol Phillips MD Attending Provider Emergency Medicine [phone] Dusty Zacarias MD Attending Provider [facility] [phone] Padma Bullard MD Attending Provider Internal Medicine [phone] Ty Morrison MD Attending Provider Internal Medicine [phone] Your Primary Care Physician (PCP) Primary Care Physician Office Phone Office Fax Edgar Salazar [phone] Follow-up Information Follow up With Details Comments Contact Info Yuniel Valle MD Schedule an appointment as soon as possible for a visit in 1 week  9 [facility] [place] [phone] 
[phone] Current Discharge Medication List  
  
START taking these medications Dose & Instructions Dispensing Information Comments Morning Noon Evening Bedtime  
 amoxicillin-clavulanate 875-125 mg per tablet Commonly known as:  AUGMENTIN Your last dose was: Your next dose is:    
   
   
 Dose:  1 Tab Take 1 Tab by mouth two (2) times a day for 5 days. Quantity:  10 Tab Refills:  0 CONTINUE these medications which have NOT CHANGED Dose & Instructions Dispensing Information Comments Morning Noon Evening Bedtime  
 albuterol-ipratropium 2.5 mg-0.5 mg/3 ml Nebu Commonly known as:  Oumar Shadi Your last dose was: Your next dose is:    
   
   
 Dose:  3 mL  
3 mL by Nebulization route every four (4) hours as needed. Quantity:  30 Nebule Refills:  1  
     
   
   
   
  
 aspirin 81 mg tablet Your last dose was: Your next dose is:    
   
   
 Dose:  81 mg Take 81 mg by mouth. Refills:  0 BENICAR 40 mg tablet Generic drug:  olmesartan Your last dose was: Your next dose is: Take  by mouth daily. Refills:  0  
     
   
   
   
  
 COUMADIN 5 mg tablet Generic drug:  warfarin Your last dose was: Your next dose is:    
   
   
 Dose:  6 mg Take 6 mg by mouth daily. Refills:  0  
     
   
   
   
  
 FISH OIL 1,000 mg Cap Generic drug:  omega-3 fatty acids-vitamin e Your last dose was: Your next dose is:    
   
   
 Dose:  1 Cap Take 1 Cap by mouth two (2) times a day. Refills:  0  
     
   
   
   
  
 fluticasone-salmeterol 250-50 mcg/dose diskus inhaler Commonly known as:  ADVAIR Your last dose was: Your next dose is:    
   
   
 Dose:  1 Puff Take 1 Puff by inhalation two (2) times a day. Quantity:  1 Inhaler Refills:  0  
     
   
   
   
  
 furosemide 40 mg tablet Commonly known as:  LASIX Your last dose was: Your next dose is: Take  by mouth daily. Refills:  0  
     
   
   
   
  
 metFORMIN 1,000 mg tablet Commonly known as:  GLUCOPHAGE Your last dose was: Your next dose is:    
   
   
 Dose:  1000 mg Take 1,000 mg by mouth two (2) times daily (with meals). Refills:  0  
     
   
   
   
  
 metoprolol tartrate 50 mg tablet Commonly known as:  LOPRESSOR Your last dose was: Your next dose is:    
   
   
  Refills:  0 NEURONTIN 300 mg capsule Generic drug:  gabapentin Your last dose was: Your next dose is:    
   
   
 Dose:  300 mg Take 300 mg by mouth two (2) times a day. Refills:  0 NORVASC 5 mg tablet Generic drug:  amLODIPine Your last dose was: Your next dose is:    
   
   
 Dose:  5 mg Take 5 mg by mouth daily. Refills:  0 Omeprazole delayed release 20 mg tablet Commonly known as:  PRILOSEC D/R Your last dose was: Your next dose is:    
   
   
 Dose:  20 mg Take 20 mg by mouth daily. Refills:  0  
     
   
   
   
  
 pravastatin 40 mg tablet Commonly known as:  PRAVACHOL Your last dose was: Your next dose is:    
   
   
 Dose:  40 mg  
40 mg nightly. Refills:  0 Where to Get Your Medications Information on where to get these meds will be given to you by the nurse or doctor. ! Ask your nurse or doctor about these medications  
  amoxicillin-clavulanate 875-125 mg per tablet Discharge Instructions DISCHARGE SUMMARY from Nurse The following personal items are in your possession at time of discharge: 
 
Dental Appliances: None Visual Aid: None Home Medications: None Jewelry: None Clothing: None PATIENT INSTRUCTIONS: 
 
 
F-face looks uneven A-arms unable to move or move unevenly S-speech slurred or non-existent T-time-call 911 as soon as signs and symptoms begin-DO NOT go Back to bed or wait to see if you get better-TIME IS BRAIN. Warning Signs of HEART ATTACK Call 911 if you have these symptoms: 
? Chest discomfort. Most heart attacks involve discomfort in the center of the chest that lasts more than a few minutes, or that goes away and comes back. It can feel like uncomfortable pressure, squeezing, fullness, or pain. ? Discomfort in other areas of the upper body. Symptoms can include pain or discomfort in one or both arms, the back, neck, jaw, or stomach. ? Shortness of breath with or without chest discomfort. ? Other signs may include breaking out in a cold sweat, nausea, or lightheadedness. Don't wait more than five minutes to call [address]! Fast action can save your life. Calling 911 is almost always the fastest way to get lifesaving treatment. Emergency Medical Services staff can begin treatment when they arrive  up to an hour sooner than if someone gets to the hospital by car. The discharge information has been reviewed with the patient and family. The patient and family verbalized understanding. Discharge medications reviewed with the patient and caregiver and appropriate educational materials and side effects teaching were provided. Patient armband removed and shredded Learning About COPD and How to Prevent Lung Infections How do lung infections affect COPD? Lung infections like pneumonia and acute bronchitis are common causes of COPD flare-ups. And people who have COPD are more likely to get these lung infections, especially if they smoke.  
When you have COPD, it is important to know the symptoms of pneumonia and acute bronchitis and call your doctor if you have them. Symptoms include: · A cough that brings up more mucus than usual. 
· Fever. · Shortness of breath. What can you do to prevent these infections? Stay healthy · Get a flu shot every year. · Get a pneumococcal vaccine shot. If you have had one before, ask your doctor whether you need another dose. Two different types of pneumococcal vaccines are recommended for people ages 72 and older. · If you must be around people with colds or the flu, wash your hands often. · Do not smoke. This is the most important step you can take to prevent more damage to your lungs. If you need help quitting, talk to your doctor about stop-smoking programs and medicines. These can increase your chances of quitting for good. · Avoid secondhand smoke, air pollution, and high altitudes. Also avoid cold, dry air and hot, humid air. Stay at home with your windows closed when air pollution is bad. Exercise and eat well · If your doctor recommends it, get more exercise. Walking is a good choice. Bit by bit, increase the amount you walk every day. Try for at least 30 minutes on most days of the week. · Eat regular, well-balanced meals. Eating right keeps your energy levels up and helps your body fight infection. · Get plenty of rest and sleep. Follow-up care is a key part of your treatment and safety. Be sure to make and go to all appointments, and call your doctor if you are having problems. It's also a good idea to know your test results and keep a list of the medicines you take. Where can you learn more? Go to http://elizabeth-drew.info/. Enter I831 in the search box to learn more about \"Learning About COPD and How to Prevent Lung Infections. \" Current as of: May 23, 2016 Content Version: 11.2 © 7695-7494 [employer].  Care instructions adapted under license by [employer] (which disclaims liability or warranty for this information). If you have questions about a medical condition or this instruction, always ask your healthcare professional. [address] any warranty or liability for your use of this information. Learning About Diabetes Food Guidelines Your Care Instructions Meal planning is important to manage diabetes. It helps keep your blood sugar at a target level (which you set with your doctor). You don't have to eat special foods. You can eat what your family eats, including sweets once in a while. But you do have to pay attention to how often you eat and how much you eat of certain foods. You may want to work with a dietitian or a certified diabetes educator (CDE) to help you plan meals and snacks. A dietitian or CDE can also help you lose weight if that is one of your goals. What should you know about eating carbs? Managing the amount of carbohydrate (carbs) you eat is an important part of healthy meals when you have diabetes. Carbohydrate is found in many foods. · Learn which foods have carbs. And learn the amounts of carbs in different foods. ¨ Bread, cereal, pasta, and rice have about 15 grams of carbs in a serving. A serving is 1 slice of bread (1 ounce), ½ cup of cooked cereal, or 1/3 cup of cooked pasta or rice. ¨ Fruits have 15 grams of carbs in a serving. A serving is 1 small fresh fruit, such as an apple or orange; ½ of a banana; ½ cup of cooked or canned fruit; ½ cup of fruit juice; 1 cup of melon or raspberries; or 2 tablespoons of dried fruit. ¨ Milk and no-sugar-added yogurt have 15 grams of carbs in a serving. A serving is 1 cup of milk or 2/3 cup of no-sugar-added yogurt. ¨ Starchy vegetables have 15 grams of carbs in a serving. A serving is ½ cup of mashed potatoes or sweet potato; 1 cup winter squash; ½ of a small baked potato; ½ cup of cooked beans; or ½ cup cooked corn or green peas.  
· Learn how much carbs to eat each day and at each meal. A dietitian or CDE can teach you how to keep track of the amount of carbs you eat. This is called carbohydrate counting. · If you are not sure how to count carbohydrate grams, use the Plate Method to plan meals. It is a good, quick way to make sure that you have a balanced meal. It also helps you spread carbs throughout the day. ¨ Divide your plate by types of foods. Put non-starchy vegetables on half the plate, meat or other protein food on one-quarter of the plate, and a grain or starchy vegetable in the final quarter of the plate. To this you can add a small piece of fruit and 1 cup of milk or yogurt, depending on how many carbs you are supposed to eat at a meal. 
· Try to eat about the same amount of carbs at each meal. Do not \"save up\" your daily allowance of carbs to eat at one meal. 
· Proteins have very little or no carbs per serving. Examples of proteins are beef, chicken, turkey, fish, eggs, tofu, cheese, cottage cheese, and peanut butter. A serving size of meat is 3 ounces, which is about the size of a deck of cards. Examples of meat substitute serving sizes (equal to 1 ounce of meat) are 1/4 cup of cottage cheese, 1 egg, 1 tablespoon of peanut butter, and ½ cup of tofu. How can you eat out and still eat healthy? · Learn to estimate the serving sizes of foods that have carbohydrate. If you measure food at home, it will be easier to estimate the amount in a serving of restaurant food. · If the meal you order has too much carbohydrate (such as potatoes, corn, or baked beans), ask to have a low-carbohydrate food instead. Ask for a salad or green vegetables. · If you use insulin, check your blood sugar before and after eating out to help you plan how much to eat in the future. · If you eat more carbohydrate at a meal than you had planned, take a walk or do other exercise. This will help lower your blood sugar. What else should you know? · Limit saturated fat, such as the fat from meat and dairy products.  This is a healthy choice because people who have diabetes are at higher risk of heart disease. So choose lean cuts of meat and nonfat or low-fat dairy products. Use olive or canola oil instead of butter or shortening when cooking. · Don't skip meals. Your blood sugar may drop too low if you skip meals and take insulin or certain medicines for diabetes. · Check with your doctor before you drink alcohol. Alcohol can cause your blood sugar to drop too low. Alcohol can also cause a bad reaction if you take certain diabetes medicines. Follow-up care is a key part of your treatment and safety. Be sure to make and go to all appointments, and call your doctor if you are having problems. It's also a good idea to know your test results and keep a list of the medicines you take. Where can you learn more? Go to http://elizabeth-drew.info/. Enter Q934 in the search box to learn more about \"Learning About Diabetes Food Guidelines. \" Current as of: May 23, 2016 Content Version: 11.2 © 2779-8198 [employer]. Care instructions adapted under license by [employer] (which disclaims liability or warranty for this information). If you have questions about a medical condition or this instruction, always ask your healthcare professional. [address] any warranty or liability for your use of this information. Discharge Orders None Phloronol Announcement We are excited to announce that we are making your provider's discharge notes available to you in Phloronol. You will see these notes when they are completed and signed by the physician that discharged you from your recent hospital stay. If you have any questions or concerns about any information you see in Phloronol, please call the Health Information Department where you were seen or reach out to your Primary Care Provider for more information about your plan of care. General Information Please provide this summary of care documentation to your next provider. Patient Signature:  ____________________________________________________________ Date:  ____________________________________________________________  
  
[facility] Provider Signature:  ____________________________________________________________ Date:  ____________________________________________________________

## 2017-03-23 NOTE — H&P
History and Physical    Patient: Gareth Angeles               Sex: male          DOA: 3/23/2017       YOB: 1940      Age:  64 y.o.        LOS:  LOS: 0 days        HPI:     Gareth Angeles is a 64 y.o. male who presented to the ED with complaints of cough, weakness, and SOB. This has been worsening at home for about 3 days. There was subjective fever associated with the symptoms. The cough has been non-productive. He denies chest pain. In the ER there is evidence for pneumonia with potential fluid overload and he will be admitted for ongoing management.     Past Medical History:   Diagnosis Date    A-fib [facility])     Bladder cancer ([address]     TURBT in 2010    CAD (coronary artery disease)     COPD (chronic obstructive pulmonary disease) (HCC)     Coronary artery disease     Dementia     Diabetes ([address]     Diabetes ([address]     Diarrhea, unspecified     Encounter for postoperative care     Feeling of incomplete bladder emptying     GERD (gastroesophageal reflux disease)     History of kidney stones     History of prostate cancer     Hypercholesterolemia     Hyperlipidemia     Hypertension     Intermittent urinary stream     Kidney stone     Malignant neoplasm of other specified sites of bladder ([address]     Malignant neoplasm of prostate ([address]     Malignant neoplasm of urinary bladder (HCC)     Nocturia     Pacemaker     Personal history of urinary calculi     Prostate CA ([address]     Prostate cancer ([address]     s/p seed implant and XRT    Urinary frequency     Urinary urgency        Social History:   Tobacco use:  Patient no longer smokes, he quit 3-5 years ago   Alcohol use:  Patient does not use alcohol   Patient lives with his wife    Family History:   Multiple family members with HTN     Review of Systems    Constitutional:  No fever or weight loss  HEENT:  No headache or visual changes  Cardiovascular:  No chest pain or diaphoresis  Respiratory:  No coughing, wheezing, or shortness of breath. GI:  No nausea or vomitting. No diarrhea  :  No hematuria or dysuria  Skin:  No rashes or moles  Neuro:  No seizures or syncope  Hematological:  No bruising or bleeding  Endocrine:  No diabetes or thyroid disease    Physical Exam:      Visit Vitals    /61    Pulse 68    Temp 99.2 °F (37.3 °C)    Resp 21    Wt 113.4 kg (250 lb)    SpO2 99%    BMI 39.16 kg/m2       Physical Exam:    Gen:  No distress, alert  HEENT:  Normal cephalic atraumatic, extra-occular movements are intact. Neck:  Supple, No JVD  Lungs:  Rhonchi bilaterally with fair aeration  Heart:  Regular Rate and Rhythm, normal S1 and S2, no edema  Abdomen:  Soft, non tender, normal bowel sounds, no guarding.   Extremities:  Well perfused, no cyanosis or edema  Neurological:  Awake and alert, CN's are intact, normal strength throughout extremities  Skin:  No rashes or moles  Mental Status:  Normal thought process, does not appear anxious    Laboratory Studies:    BMP:   Lab Results   Component Value Date/Time     03/24/2017 06:30 AM    K 4.0 03/24/2017 06:30 AM     03/24/2017 06:30 AM    CO2 27 03/24/2017 06:30 AM    AGAP 8 03/24/2017 06:30 AM     (H) 03/24/2017 06:30 AM    BUN 20 (H) 03/24/2017 06:30 AM    CREA 1.03 03/24/2017 06:30 AM    GFRAA >60 03/24/2017 06:30 AM    GFRNA >60 03/24/2017 06:30 AM     CBC:   Lab Results   Component Value Date/Time    WBC 4.8 03/24/2017 06:30 AM    HGB 11.6 (L) 03/24/2017 06:30 AM    HCT 37.9 03/24/2017 06:30 AM     03/24/2017 06:30 AM       Assessment/Plan     Active Problems:    Bladder cancer ([facility] (6/10/2014)      Chronic a-fib ([facility] (9/8/2014)      CAD (coronary artery disease) (9/8/2014)      HTN (hypertension) (9/8/2014)      DM (diabetes mellitus) ([facility] (9/8/2014)      Hyperlipidemia (9/8/2014)      Pneumonia (3/23/2017)        PLAN:    Will treat with IV antibiotics  Manage fluid status and follow respiratory status  BP control  BS control

## 2017-03-23 NOTE — PROGRESS NOTES
**P&T Committee has authorized the Automatic Substitution of  Advair Oral Inhaler to Budesonide and Arformoterol nebulizer solutions. **  **If the use of an inhaler device is of medical necessity, please indicate Do Not Substitute or Dispense As Written below. **  **[facility] will administer all doses of nebulizer solution in this facility. **

## 2017-03-23 NOTE — ED NOTES
Assumed care of pt, report received from [facility]. Pt now on high flow O2 saturation in the 90s.

## 2017-03-23 NOTE — ED TRIAGE NOTES
Pt arrived through triage with c/o fall x 3 and AMS \"for quite a while\" per family. Pt states this has been an on going issue for \"quite some time. \"

## 2017-03-23 NOTE — PROGRESS NOTES
36 nurse spoke to pt and family, pt encouraged to please call for assistance with ambulation, bed alarm activated, call bell and urinal within reach, savanna winter

## 2017-03-23 NOTE — ED NOTES
The Sepsis Screening has been completed on arrival in the Emergency Department. Vital signs:  Patient Vitals for the past 4 hrs:   Pulse Resp BP SpO2   03/23/17 1300 68 26 166/54 94 %   03/23/17 1215 - - - 94 %   03/23/17 1203 77 24 - 93 %   03/23/17 1200 - - 187/79 90 %   03/23/17 1157 - - - (!) 58 %   03/23/17 1156 - - - (!) 61 %   03/23/17 1155 - - 168/82 -   03/23/17 1130 87 24 (!) 130/94 -   03/23/17 1129 100 28 - -   03/23/17 1128 85 30 - -   03/23/17 1127 80 29 - -   03/23/17 1126 78 24 - -   03/23/17 1125 75 30 - -   03/23/17 1124 79 27 - -   03/23/17 1123 76 28 - -   03/23/17 1122 77 25 - -   03/23/17 1111 65 22 - 91 %   03/23/17 1105 64 24 - 90 %   03/23/17 1100 65 24 159/59 (!) 89 %   03/23/17 1030 60 25 181/67 (!) 89 %   03/23/17 1000 62 23 194/77 93 %       MAP (Monitor): 81    =monitored (data validate)  MAP (Calculated): 95    =calculated (manual entry)    Patient meets 2 or more SIRS criteria with suspected infection? NO    IF ANSWER IS YES, INITIATE NURSE DRIVEN SEPSIS ORDERS OR REQUEST SEPSIS BUNDLE ORDER BY PROVIDER.      SIRS Criteria is achieved when two or more of the following are present:    Temperature < 96.8°F (36°C) or > 100.9°F (38.3°C)   Heart Rate > 90 beats per minute   Respiratory Rate > 20 beats per minute

## 2017-03-23 NOTE — ED PROVIDER NOTES
HPI Comments: 7:38 AM Bernice Lauren is a 64 y.o. male with noted PMHx who presents to the ED s/p fall c/o AMS that began PTA after falling. A relative explains he fell out of the bed 3 times, once last night, once at 1 AM, and again after 4 AM. Per nursing staff, pt's confusion is an ongoing issue and pt has a PCP appointment coming up. A relative explains the pt has progressive confusion for 1 year. Pt does not use oxygen at home. Pt does not have a history of wheezing, but has c/o low O2 sat and wheezing. Pt did hit his head against a light. Pt denies any pain, and is not complaining of any other symptoms currently. Pt denies smoking. Pt denies EtOH use. Pt denies any other illicit drug use. Pt takes Coumadin, last dosage taken last night. There are no other concerns at this time. Past Medical History:   Diagnosis Date    A-fib [facility])     Bladder cancer ([address]     TURBT in 2010    CAD (coronary artery disease)     COPD (chronic obstructive pulmonary disease) (HCC)     Coronary artery disease     Dementia     Diabetes ([address]     Diabetes ([address]     Diarrhea, unspecified     Encounter for postoperative care     Feeling of incomplete bladder emptying     GERD (gastroesophageal reflux disease)     History of kidney stones     History of prostate cancer     Hypercholesterolemia     Hyperlipidemia     Hypertension     Intermittent urinary stream     Kidney stone     Malignant neoplasm of other specified sites of bladder ([address]     Malignant neoplasm of prostate ([address]     Malignant neoplasm of urinary bladder ([address]     Nocturia     Pacemaker     Personal history of urinary calculi     Prostate CA ([address]     Prostate cancer ([address]     s/p seed implant and XRT    Urinary frequency     Urinary urgency        Past Surgical History:   Procedure Laterality Date    CARDIAC SURG PROCEDURE UNLIST  1989    HX.  OF BYPASS GRAFT    HX OTHER SURGICAL      BIOPSY PROSTATE    HX PACEMAKER  08/12/2015 Pulse generator replacement. Dr. Soledad Ly.  HX PACEMAKER PLACEMENT  09/25/2008         Family History:   Problem Relation Age of Onset    Heart Attack Mother     Arthritis-osteo Father        Social History     Social History    Marital status:      Spouse name: N/A    Number of children: N/A    Years of education: N/A     Occupational History    Not on file. Social History Main Topics    Smoking status: Former Smoker     Packs/day: 1.00     Years: 45.00     Types: Cigarettes     Quit date: 4/27/1989    Smokeless tobacco: Never Used    Alcohol use No    Drug use: No    Sexual activity: Not on file     Other Topics Concern    Not on file     Social History Narrative         ALLERGIES: Review of patient's allergies indicates no known allergies. Review of Systems   Constitutional: Negative for diaphoresis and fever. HENT: Negative for ear pain, rhinorrhea and trouble swallowing. Eyes: Negative for visual disturbance. Respiratory: Positive for wheezing. Negative for cough and shortness of breath. Cardiovascular: Negative for chest pain and leg swelling. Gastrointestinal: Negative for abdominal pain, blood in stool, diarrhea, nausea and vomiting. Genitourinary: Negative for difficulty urinating, flank pain and hematuria. Musculoskeletal: Negative for back pain, myalgias and neck pain. Skin: Negative for rash. Neurological: Negative for dizziness, weakness, numbness and headaches. Hematological: Negative. Psychiatric/Behavioral: Positive for confusion. All other systems reviewed and are negative. Vitals:    03/23/17 1156 03/23/17 1157 03/23/17 1200 03/23/17 1203   BP:   187/79    Pulse:    77   Resp:    24   Temp:       SpO2: (!) 61% (!) 58% 90% 93%   Weight:                Physical Exam   Constitutional: He is oriented to person, place, and time. He appears well-developed and well-nourished. No distress. HENT:   Head: Normocephalic and atraumatic.    Dry mucous membrane   Eyes: Conjunctivae and EOM are normal. Pupils are equal, round, and reactive to light. No scleral icterus. Neck: Normal range of motion. Neck supple. Cardiovascular: Normal rate, regular rhythm and normal heart sounds. No murmur heard. Pulmonary/Chest: Effort normal. Tachypnea noted. No respiratory distress. Rhonchi in the R base of the lungs   Abdominal: Soft. Bowel sounds are normal. He exhibits no distension. There is no tenderness. Musculoskeletal: He exhibits edema (Trace LE). Lymphadenopathy:     He has no cervical adenopathy. Neurological: He is alert and oriented to person, place, and time. Coordination normal. GCS eye subscore is 4. GCS verbal subscore is 4. GCS motor subscore is 6. Skin: Skin is warm and dry. No rash noted. Psychiatric: He has a normal mood and affect. His behavior is normal.   Nursing note and vitals reviewed. MDM  Number of Diagnoses or Management Options  Acute bronchitis due to other specified organisms:   Elevated lactic acid level:   Diagnosis management comments:  Altered mental status progressively for the last few months with increased over last few days multiple falls last night poor po intake increased cough septic protocol enacted concern for pneumonia     ekg pace rhythm rate 60     Pt decompensated became more agitated noted hypoxic with sats 58 after pulled off all of leads etc placed on NRB and NC with improvement placed on high flow NC with improvement abg reviewed repeat cxr increased congestion R        Amount and/or Complexity of Data Reviewed  Clinical lab tests: ordered and reviewed  Tests in the radiology section of CPT®: ordered and reviewed  Independent visualization of images, tracings, or specimens: yes    Risk of Complications, Morbidity, and/or Mortality  Presenting problems: high  Diagnostic procedures: high  Management options: high    Critical Care  Total time providing critical care: 30-74 minutes (30 min)    Patient Progress  Patient progress: stable    ED Course       Procedures      Vitals:  Patient Vitals for the past 12 hrs:   Temp Pulse Resp BP SpO2   03/23/17 1203 - 77 24 - 93 %   03/23/17 1200 - - - 187/79 90 %   03/23/17 1157 - - - - (!) 58 %   03/23/17 1156 - - - - (!) 61 %   03/23/17 1155 - - - 168/82 -   03/23/17 1130 - 87 24 (!) 130/94 -   03/23/17 1129 - 100 28 - -   03/23/17 1128 - 85 30 - -   03/23/17 1127 - 80 29 - -   03/23/17 1126 - 78 24 - -   03/23/17 1125 - 75 30 - -   03/23/17 1124 - 79 27 - -   03/23/17 1123 - 76 28 - -   03/23/17 1122 - 77 25 - -   03/23/17 1111 - 65 22 - 91 %   03/23/17 1105 - 64 24 - 90 %   03/23/17 1100 - 65 24 159/59 (!) 89 %   03/23/17 1030 - 60 25 181/67 (!) 89 %   03/23/17 1000 - 62 23 194/77 93 %   03/23/17 0930 - 63 23 152/72 (!) 89 %   03/23/17 0915 - 65 27 189/69 92 %   03/23/17 0900 99.2 °F (37.3 °C) - - - -   03/23/17 0800 - 62 27 146/68 92 %   03/23/17 0745 98.4 °F (36.9 °C) 64 24 158/64 96 %   96% on RA, indicating adequate oxygenation.       Medications ordered:   Medications   sodium chloride (NS) flush 5-10 mL (not administered)   cefTRIAXone (ROCEPHIN) 2 g in 0.9% sodium chloride (MBP/ADV) 50 mL MBP (0 g IntraVENous IV Completed 3/23/17 0950)   azithromycin (ZITHROMAX) 500 mg in 0.9% sodium chloride (MBP/ADV) 250 mL adv (0 mg IntraVENous IV Completed 3/23/17 1210)   haloperidol lactate (HALDOL) injection 2.5 mg (not administered)   diphenhydrAMINE (BENADRYL) injection 12.5 mg (not administered)   0.9% sodium chloride infusion 3,402 mL (0 mL/kg × 113.4 kg IntraVENous IV Completed 3/23/17 1210)   potassium chloride (K-DUR, KLOR-CON) SR tablet 40 mEq (40 mEq Oral Given 3/23/17 0920)   albuterol-ipratropium (DUO-NEB) 2.5 mg-0.5 mg/3 ml nebulizer solution (  Given 3/23/17 1203)         Lab findings:  Recent Results (from the past 12 hour(s))   CBC WITH AUTOMATED DIFF    Collection Time: 03/23/17  8:10 AM   Result Value Ref Range    WBC 7.0 4.6 - 13.2 K/uL RBC 4.97 4.70 - 5.50 M/uL    HGB 12.1 (L) 13.0 - 16.0 g/dL    HCT 39.2 36.0 - 48.0 %    MCV 78.9 74.0 - 97.0 FL    MCH 24.3 24.0 - 34.0 PG    MCHC 30.9 (L) 31.0 - 37.0 g/dL    RDW 15.5 (H) 11.6 - 14.5 %    PLATELET 187 747 - 410 K/uL    MPV 9.7 9.2 - 11.8 FL    NEUTROPHILS 70 40 - 73 %    LYMPHOCYTES 18 (L) 21 - 52 %    MONOCYTES 12 (H) 3 - 10 %    EOSINOPHILS 0 0 - 5 %    BASOPHILS 0 0 - 2 %    ABS. NEUTROPHILS 4.9 1.8 - 8.0 K/UL    ABS. LYMPHOCYTES 1.2 0.9 - 3.6 K/UL    ABS. MONOCYTES 0.9 0.05 - 1.2 K/UL    ABS. EOSINOPHILS 0.0 0.0 - 0.4 K/UL    ABS. BASOPHILS 0.0 0.0 - 0.06 K/UL    DF AUTOMATED     METABOLIC PANEL, COMPREHENSIVE    Collection Time: 03/23/17  8:10 AM   Result Value Ref Range    Sodium 139 136 - 145 mmol/L    Potassium 3.2 (L) 3.5 - 5.5 mmol/L    Chloride 103 100 - 108 mmol/L    CO2 27 21 - 32 mmol/L    Anion gap 9 3.0 - 18 mmol/L    Glucose 127 (H) 74 - 99 mg/dL    BUN 16 7.0 - 18 MG/DL    Creatinine 1.18 0.6 - 1.3 MG/DL    BUN/Creatinine ratio 14 12 - 20      GFR est AA >60 >60 ml/min/1.73m2    GFR est non-AA 60 (L) >60 ml/min/1.73m2    Calcium 8.1 (L) 8.5 - 10.1 MG/DL    Bilirubin, total 0.7 0.2 - 1.0 MG/DL    ALT (SGPT) 23 16 - 61 U/L    AST (SGOT) 46 (H) 15 - 37 U/L    Alk.  phosphatase 67 45 - 117 U/L    Protein, total 6.7 6.4 - 8.2 g/dL    Albumin 3.5 3.4 - 5.0 g/dL    Globulin 3.2 2.0 - 4.0 g/dL    A-G Ratio 1.1 0.8 - 1.7     CULTURE, BLOOD    Collection Time: 03/23/17  8:10 AM   Result Value Ref Range    Special Requests: PERIPHERAL      Culture result: PENDING    CARDIAC PANEL,(CK, CKMB & TROPONIN)    Collection Time: 03/23/17  8:10 AM   Result Value Ref Range     (H) 39 - 308 U/L    CK - MB 3.1 <3.6 ng/ml    CK-MB Index 1.0 0.0 - 4.0 %    Troponin-I, Qt. 0.07 (H) 0.0 - 0.045 NG/ML   PROTHROMBIN TIME + INR    Collection Time: 03/23/17  8:10 AM   Result Value Ref Range    Prothrombin time 17.4 (H) 11.5 - 15.2 sec    INR 1.5 (H) 0.8 - 1.2     PTT    Collection Time: 03/23/17  8:10 AM Result Value Ref Range    aPTT 39.1 (H) 23.0 - 36.4 SEC   MAGNESIUM    Collection Time: 03/23/17  8:10 AM   Result Value Ref Range    Magnesium 1.9 1.8 - 2.4 mg/dL   CALCIUM, IONIZED    Collection Time: 03/23/17  8:10 AM   Result Value Ref Range    Ionized Calcium 1.10 (L) 1.12 - 1.32 MMOL/L   CULTURE, BLOOD    Collection Time: 03/23/17  8:20 AM   Result Value Ref Range    Special Requests: PERIPHERAL      Culture result: PENDING    POC LACTIC ACID    Collection Time: 03/23/17  8:21 AM   Result Value Ref Range    Lactic Acid (POC) 2.6 (HH) 0.4 - 2.0 mmol/L   EKG, 12 LEAD, INITIAL    Collection Time: 03/23/17  9:04 AM   Result Value Ref Range    Ventricular Rate 60 BPM    Atrial Rate 60 BPM    QRS Duration 188 ms    Q-T Interval 490 ms    QTC Calculation (Bezet) 490 ms    Calculated R Axis -68 degrees    Calculated T Axis 110 degrees    Diagnosis       V paced  When compared with ECG of 08-SEP-2014 12:30,  No significant change was found  Confirmed by Annabelle Alfaro ([id number]) on 3/23/2017 11:13:32 AM     URINALYSIS W/ RFLX MICROSCOPIC    Collection Time: 03/23/17  9:25 AM   Result Value Ref Range    Color YELLOW      Appearance CLEAR      Specific gravity 1.026 1.005 - 1.030      pH (UA) 6.0 5.0 - 8.0      Protein 100 (A) NEG mg/dL    Glucose NEGATIVE  NEG mg/dL    Ketone TRACE (A) NEG mg/dL    Bilirubin NEGATIVE  NEG      Blood LARGE (A) NEG      Urobilinogen 1.0 0.2 - 1.0 EU/dL    Nitrites NEGATIVE  NEG      Leukocyte Esterase TRACE (A) NEG     URINE MICROSCOPIC ONLY    Collection Time: 03/23/17  9:25 AM   Result Value Ref Range    WBC 4 to 10 0 - 4 /hpf    RBC TOO NUMEROUS TO COUNT 0 - 5 /hpf    Epithelial cells FEW 0 - 5 /lpf    Bacteria 1+ (A) NEG /hpf   POC LACTIC ACID    Collection Time: 03/23/17 11:48 AM   Result Value Ref Range    Lactic Acid (POC) 4.6 (HH) 0.4 - 2.0 mmol/L         X-Ray, CT or other radiology findings or impressions:  CT HEAD WO CONT   IMPRESSION:        1.  No acute intracranial abnormality. 2. Mild subcortical and periventricular white matter hypoattenuation;  nonspecific finding likely pertaining to sequela of chronic ischemic  microvascular change. 3. Mucosal thickening of the anterior and posterior ethmoid air cells, with  findings of chronic sphenoid sinusitis. Billy Conception 3/23/2017 09:08 [phone]        XR CHEST PORT    Impression: Questionable RLL infiltrate. Read by Dr. Alexandr Griggs. Progress notes, Consult notes or additional Procedure notes:   12:21 PM Consult:  Discussed care with Dr. Angelika Donaldson discussion; including history of patients chief complaint, available diagnostic results, and treatment course. Dr. Zondra Canavan agreed to admit the pt. Reevaluation of patient:   11:51 AM: Pt re-evaluated. Lactic acid much higher. Will continue with fluid bolus. 12:05 PM: Pt in respiratory distress and is agitated. Placed on NRB +5 L nasal canula. Sats at 93. Respiratory call for high flow nasal canula. Disposition:  Diagnosis:   1. Acute bronchitis due to other specified organisms    2. Elevated lactic acid level        Disposition: Admitted     Follow-up Information     None           Patient's Medications   Start Taking    No medications on file   Continue Taking    ALBUTEROL-IPRATROPIUM (DUO-NEB) 2.5 MG-0.5 MG/3 ML NEBU    3 mL by Nebulization route every four (4) hours as needed. AMLODIPINE (NORVASC) 5 MG TABLET    Take 5 mg by mouth daily. ASPIRIN 81 MG TABLET    Take 81 mg by mouth. FLUTICASONE-SALMETEROL (ADVAIR) 250-50 MCG/DOSE DISKUS INHALER    Take 1 Puff by inhalation two (2) times a day. FUROSEMIDE (LASIX) 40 MG TABLET    Take  by mouth daily. GABAPENTIN (NEURONTIN) 300 MG CAPSULE    Take 300 mg by mouth two (2) times a day. METFORMIN (GLUCOPHAGE) 1,000 MG TABLET    Take 1,000 mg by mouth two (2) times daily (with meals). METOPROLOL TARTRATE (LOPRESSOR) 50 MG TABLET        OLMESARTAN (BENICAR) 40 MG TABLET    Take  by mouth daily. OMEGA-3 FATTY ACIDS-VITAMIN E (FISH OIL) 1,000 MG CAP    Take 1 Cap by mouth two (2) times a day. OMEPRAZOLE DELAYED RELEASE (PRILOSEC D/R) 20 MG TABLET    Take 20 mg by mouth daily. PRAVASTATIN (PRAVACHOL) 40 MG TABLET    40 mg nightly. WARFARIN (COUMADIN) 5 MG TABLET    Take 6 mg by mouth daily. These Medications have changed    No medications on file   Stop Taking    No medications on file       SCRIBE ATTESTATION STATEMENT  Documented by: Allie Marshall for, and in the presence of, Noreen Cooks, MD 7:48 AM     Signed by: Jose Marrero. 03/23/17, 7:48 AM.      PROVIDER ATTESTATION STATEMENT  I personally performed the services described in the documentation, reviewed the documentation, as recorded by the scribe in my presence, and it accurately and completely records my words and actions.   Noreen Cooks, MD

## 2017-03-23 NOTE — ED NOTES
Patient brought to 2114 with respiratory therapist, patient able to ambulate to the restroom, Paul Lazaro RN at the bedside

## 2017-03-23 NOTE — PROGRESS NOTES
1215  Pt s/u on HFNC @ 40 lpm, FiO2-60%. ABG done, results to Dr. Teri Roberson. Sats-94%, HR-68, RR-22. Respiratory will continue to monitor.

## 2017-03-23 NOTE — IP AVS SNAPSHOT
Current Discharge Medication List  
  
START taking these medications Dose & Instructions Dispensing Information Comments Morning Noon Evening Bedtime  
 amoxicillin-clavulanate 875-125 mg per tablet Commonly known as:  AUGMENTIN Your last dose was: Your next dose is:    
   
   
 Dose:  1 Tab Take 1 Tab by mouth two (2) times a day for 5 days. Quantity:  10 Tab Refills:  0 CONTINUE these medications which have NOT CHANGED Dose & Instructions Dispensing Information Comments Morning Noon Evening Bedtime  
 albuterol-ipratropium 2.5 mg-0.5 mg/3 ml Nebu Commonly known as:  Mike Laming Your last dose was: Your next dose is:    
   
   
 Dose:  3 mL  
3 mL by Nebulization route every four (4) hours as needed. Quantity:  30 Nebule Refills:  1  
     
   
   
   
  
 aspirin 81 mg tablet Your last dose was: Your next dose is:    
   
   
 Dose:  81 mg Take 81 mg by mouth. Refills:  0 BENICAR 40 mg tablet Generic drug:  olmesartan Your last dose was: Your next dose is: Take  by mouth daily. Refills:  0  
     
   
   
   
  
 COUMADIN 5 mg tablet Generic drug:  warfarin Your last dose was: Your next dose is:    
   
   
 Dose:  6 mg Take 6 mg by mouth daily. Refills:  0  
     
   
   
   
  
 FISH OIL 1,000 mg Cap Generic drug:  omega-3 fatty acids-vitamin e Your last dose was: Your next dose is:    
   
   
 Dose:  1 Cap Take 1 Cap by mouth two (2) times a day. Refills:  0  
     
   
   
   
  
 fluticasone-salmeterol 250-50 mcg/dose diskus inhaler Commonly known as:  ADVAIR Your last dose was: Your next dose is:    
   
   
 Dose:  1 Puff Take 1 Puff by inhalation two (2) times a day. Quantity:  1 Inhaler Refills:  0  
     
   
   
   
  
 furosemide 40 mg tablet Commonly known as:  LASIX Your last dose was: Your next dose is: Take  by mouth daily. Refills:  0  
     
   
   
   
  
 metFORMIN 1,000 mg tablet Commonly known as:  GLUCOPHAGE Your last dose was: Your next dose is:    
   
   
 Dose:  1000 mg Take 1,000 mg by mouth two (2) times daily (with meals). Refills:  0  
     
   
   
   
  
 metoprolol tartrate 50 mg tablet Commonly known as:  LOPRESSOR Your last dose was: Your next dose is:    
   
   
  Refills:  0 NEURONTIN 300 mg capsule Generic drug:  gabapentin Your last dose was: Your next dose is:    
   
   
 Dose:  300 mg Take 300 mg by mouth two (2) times a day. Refills:  0 NORVASC 5 mg tablet Generic drug:  amLODIPine Your last dose was: Your next dose is:    
   
   
 Dose:  5 mg Take 5 mg by mouth daily. Refills:  0 Omeprazole delayed release 20 mg tablet Commonly known as:  PRILOSEC D/R Your last dose was: Your next dose is:    
   
   
 Dose:  20 mg Take 20 mg by mouth daily. Refills:  0  
     
   
   
   
  
 pravastatin 40 mg tablet Commonly known as:  PRAVACHOL Your last dose was: Your next dose is:    
   
   
 Dose:  40 mg  
40 mg nightly. Refills:  0 Where to Get Your Medications Information on where to get these meds will be given to you by the nurse or doctor. ! Ask your nurse or doctor about these medications  
  amoxicillin-clavulanate 875-125 mg per tablet

## 2017-03-24 NOTE — PROGRESS NOTES
Assumed care of pt from night nurse Ann and preceptor Drea Mckee. Received with patient in  Up in chair, reminded to call for assistance, pt verbalized understanding. .  Patient denies pain and/or discomfort. Call light in reach. Encouraged to call for assistance, Pt verbalized understanding.

## 2017-03-24 NOTE — PROGRESS NOTES
1938: Bedside verbal shift report received from Yeny Waters RN. Patient was sitting on the bed. Pt was then assisted to rest in bed, and is now watching TV, call bell within reach. Pt has no complaints of pain. 2000: Bed alarm in place. 2200: Assisted pt from sitting on the side of the bed back to bed in semi-fowlers position. Assisted pt with the urinal but pt was unsuccessful voiding. Instructed patient to press the call bell if wanting to sit up again. Pt verbalized understanding. Call bell within reach. Pt watching TV.    2210: Pt did not call for help, attempted to transfer from bed to chair alone. Yesi Norton is wet. Linens and gown were changed after the pt was given a wash up. Pt socks changed to non-slip socks. Pt insisted on sitting on the chair. No signs of distress. Close monitoring provided. 2300: Assisted pt to recliner. Bed alarm in place. Pt is resting. 0730: Bedside and Verbal shift change report given to Matias Reagan RN (oncoming nurse) by Jeffrey Nathan RN and Jessica Palmer RN (offgoing nurse). Report included the following information SBAR, Kardex, Intake/Output, MAR and Recent Results.

## 2017-03-24 NOTE — PROGRESS NOTES
conducted an initial consultation and Spiritual Assessment for Dejah Rubio, who is a 64 y. o.,male. Patients Primary Language is: Ullefty [place]. According to the patients EMR Congregation Affiliation is: [employer].     The reason the Patient came to the hospital is:   Patient Active Problem List    Diagnosis Date Noted    Pneumonia 03/23/2017    History of kidney stones 11/04/2016    Diarrhea 10/05/2016    Acute respiratory failure with hypoxia ([facility] 09/22/2016    Bilateral pneumonia 09/22/2016    Dementia 09/22/2016    Prostate CA ([facility] 09/18/2016    Chronic a-fib ([facility] 09/08/2014    CAD (coronary artery disease) 09/08/2014    HTN (hypertension) 09/08/2014    DM (diabetes mellitus) ([facility] 09/08/2014    Hyperlipidemia 09/08/2014    GERD (gastroesophageal reflux disease) 09/08/2014    Bladder cancer ([facility] 06/10/2014        The  provided the following Interventions:  Initiated a relationship of care and support with patient in room 2114 this morning at around 1030. Found patient setting up in chair at bedside after having completed his breakfast.  Patient seems to be in good spirits and is quite jovial in that he finds reasons to laugh at the world in spite of its condition. Provided information about Spiritual Care Services. Offered prayer and assurance of continued prayers on patients behalf. The following outcomes were achieved:  Patient shared limited information about his medical narrative and spiritual journey/beliefs. Patient processed feeling about current hospitalization. Patient expressed gratitude for pastoral care visit. Assessment:  Patient does not have any Tenriism/cultural needs that will affect patients preferences in health care. There are no further spiritual or Tenriism issues which require Spiritual Care Services interventions at this time.        Plan:  Chaplains will continue to follow and will provide pastoral care on an as needed/requested basis    .Giovanny Armendariz   Board Certified [address] [place] [address]   [phone]

## 2017-03-24 NOTE — ROUTINE PROCESS
Patient walking around room gown wet. Gave patient wash-up. Pulse ox sat 90% on RA without respiratory distress. Continue monitor.

## 2017-03-24 NOTE — PROGRESS NOTES
Patient has designated _______________dtr_________ to participate in his/her discharge plan and to receive any needed information.      Name: Case Cronin   Address:  Phone number:[phone]

## 2017-03-24 NOTE — PROGRESS NOTES
Shadi   Discharge Planning/ Assessment    Reasons for Intervention: Spoke with pt, lives with spouse. Designates dtr  For dcp. Wife assists with adls amb with walker. No dme in home. Lives in [place]. Dr Pawel House is pcp. [facility]. May benefit from hh.      High Risk Criteria  [x] Yes  []No   Physician Referral  [] Yes  [x]No        Date    Nursing Referral  [] Yes  [x]No        Date    Patient/Family Request  [] Yes  [x]No        Date       Resources:    Medicare  [x] Yes  []No   Medicaid  [] Yes  []No   No Resources  [] Yes  []No   Private Insurance  [x] Yes  []No    Name/Phone Number    Other  [] Yes  []No        (i.e. Workman's Comp)         Prior Services:    Prior Services  [] Yes  [x]No   Home Health  [] Yes  []No   [address]  [] Yes  []No        Number of [address]  [] Yes  []No       Meals on Wheels  [] Yes  []No   Office on Aging  [] Yes  []No   Transportation Services  [] Yes  []No   Nursing Home  [] Yes  []No        Nursing Home Name    [address]  [] Yes  []No        [address] Name    Other       Information Source:      Information obtained from  [x] Patient  [] Parent   [] Guardian  [x] Child  [x] Spouse   [] Significant Other/Partner   [] Friend      [] EMS    [] Nursing Home Chart          [] Other:   Chart Review  [] Yes  []No     Family/Support System:    Patient lives with  [] Alone    [x] Spouse   [] Significant Other  [] Children  [] Caretaker   [] Parent  [] Sibling     [] Other       Other Support System:    Is the patient responsible for care of others  [] Yes  [x]No   Information of person caring for patient on  discharge    Managers financial affairs independently  [] Yes  [x]No   If no, explain:      Status Prior to Admission:    Mental Status  [] Awake  [] Alert  [x] Oriented  [] Quiet/Calm [] Lethargic/Sedated   [] Disoriented  [] Restless/Anxious  [] Combative   Personal Care  [] Dependent  [] Independent Personal Care  [x] Requires Assistance   Meal Preparation Ability  [] Independent   [] Standby Assistance   [x] Minimal Assistance   [] Moderate Assistance  [] Maximum Assistance     [] Total Assistance   Chores  [] Independent with Chores   [] N/A Nursing Home Resident   [x] Requires Assistance   Bowel/Bladder  [] Continent  [] Catheter  [] Incontinent  [] Ostomy Self-Care    [] Urine Diversion Self-Care  [] Maximum Assistance     [] Total Assistance   Number of Persons needed for assistance    DME at home  [] Reed Everett  [] Gomez Everett   [] Commode    [] Bathroom/Grab Bars  [] Hospital Bed  [] Nebulizer  [] Oxygen           [] Raised Toilet Seat  [] Shower Chair  [] Side Rails for Bed   [] Tub Transfer Bench   [] Libertad Gilbert  [] Neville Jay      [] Other:   Vendor      Treatment Presently Receiving:    Current Treatments  [] Chemotherapy  [] Dialysis  [] Insulin  [] IVAB [] IVF   [x] O2  [] PCA   [] PT   [] RT   [] Tube Feedings   [] Wound Care     Psychosocial Evaluation:    Verbalized Knowledge of Disease Process  [x] Patient  [x]Family   Coping with Disease Process  [] Patient  []Family   Requires Further Counseling Coping with Disease Process  [] Patient  []Family     Identified Projected Needs:    Home Health Aid  [] Yes  [x]No   Transportation  [] Yes  [x]No   Education  [] Yes  [x]No        Specific Education     Financial Counseling  [] Yes  [x]No   Inability to Care for Self/Will Require 24 hour care  [] Yes  [x]No   Pain Management  [] Yes  [x]No   Home Infusion Therapy  [] Yes  [x]No   Oxygen Therapy  [] Yes  [x]No   DME  [] Yes  []No   Long Term Care Placement  [] Yes  [x]No   Rehab  [] Yes  [x]No   Physical Therapy  [x] Yes  []No   Needs Anticipated At This Time  [x] Yes  []No     Intra-Hospital Referral:    [address] [facility]  [] Yes  [x]No     [] Yes  [x]No   Patient Representative  [] Yes  [x]No   Staff for Teaching Needs  [] Yes  [x]No   Specialty Teaching Needs Diabetic Educator  [] Yes  [x]No   Referral for Diabetic Educator Needed  [] Yes  [x]No  If Yes, place order for Nutritionist or Diabetic Consult     Tentative Discharge Plan:    Home with No Services  [] Yes  [x]No   Home with Home Health Follow-up  [x] Yes  []No        If Yes, specify type    Home Care Program  [] Yes  []No        If Yes, specify type    Meals on Wheels  [] Yes  []No   Office of Aging  [] Yes  []No   NHP  [] Yes  []No   Return to the [employer]  [] Yes  []No   Rehab Therapy  [] Yes  []No   Acute Rehab  [] Yes  []No   Subacute Rehab  [] Yes  []No   Private Care  [] Yes  []No   Substance Abuse Referral  [] Yes  []No   Transportation  [] Yes  []No   Chore Service  [] Yes  []No   Inpatient Hospice  [] Yes  []No   OP RT  [] Yes  [] No   OP Hemo  [] Yes  [] No   OP PT  [] Yes  []No   Support Group  [] Yes  []No   Reach to Recovery  [] Yes  []No   OP Oncology Clinic  [] Yes  []No   Clinic Appointment  [] Yes  []No   DME  [] Yes  []No   Comments    Name of D/C Planner or  Given to Patient or Family Goldie gabriel rn cm   Phone Number [phone]        Extension    Date 3/24/17   Time    If you are discharged home, whom do you designate to participate in your discharge plan and receive any information needed?      Enter name of designee         Phone # of designee         Address of designee         Updated         Patient refused to designate any           individual

## 2017-03-25 NOTE — ROUTINE PROCESS
Bedside and Verbal shift change report given to Bud Gonzales RN (oncoming nurse) by Hayes Weller RN (offgoing nurse). Report included the following information SBAR, Kardex, Procedure Summary, Intake/Output and MAR.

## 2017-03-25 NOTE — PROGRESS NOTES
Progress Note      Patient: Bernice Lauren               Sex: male          DOA: 3/23/2017       YOB: 1940      Age:  64 y.o.        LOS:  LOS: 1 day             CHIEF COMPLAINT:  Pneumonia with altered mental status    Subjective:     Patient is more alert today  Talking clearly    Objective:      Visit Vitals    /62 (BP 1 Location: Left arm, BP Patient Position: At rest)    Pulse 70    Temp 98.3 °F (36.8 °C)    Resp 14    Ht 5' 8\" (1.727 m)    Wt 113.4 kg (250 lb)    SpO2 96%    BMI 38.01 kg/m2       Physical Exam:  Gen:  No distress, no complaint  Lungs:  Clear bilaterally, no wheeze or rhonchi  Heart:  Regular rate and rhythm, no murmurs or gallops  Abdomen:  Soft, non-tender, normal bowel sounds        Lab/Data Reviewed:  BMP:   Lab Results   Component Value Date/Time     03/24/2017 06:30 AM    K 4.0 03/24/2017 06:30 AM     03/24/2017 06:30 AM    CO2 27 03/24/2017 06:30 AM    AGAP 8 03/24/2017 06:30 AM     (H) 03/24/2017 06:30 AM    BUN 20 (H) 03/24/2017 06:30 AM    CREA 1.03 03/24/2017 06:30 AM    GFRAA >60 03/24/2017 06:30 AM    GFRNA >60 03/24/2017 06:30 AM     CBC:   Lab Results   Component Value Date/Time    WBC 4.8 03/24/2017 06:30 AM    HGB 11.6 (L) 03/24/2017 06:30 AM    HCT 37.9 03/24/2017 06:30 AM     03/24/2017 06:30 AM           Assessment/Plan     Active Problems:    Bladder cancer ([facility] (6/10/2014)      Chronic a-fib ([facility] (9/8/2014)      CAD (coronary artery disease) (9/8/2014)      HTN (hypertension) (9/8/2014)      DM (diabetes mellitus) ([facility] (9/8/2014)      Hyperlipidemia (9/8/2014)      Pneumonia (3/23/2017)        Plan:  Continue with IV antibiotics  Follow mental status  BP control  BS control

## 2017-03-25 NOTE — PROGRESS NOTES
Progress Note      Patient: Priyanka Franks               Sex: male          DOA: 3/23/2017       YOB: 1940      Age:  64 y.o.        LOS:  LOS: 2 days               Subjective:   Pt denies sob today . He seems to have been adequately diuresed with lasix . There seems to be no evidence of a pneumonia presently he is afebrile . Objective:      Visit Vitals    /65 (BP 1 Location: Left arm, BP Patient Position: At rest)    Pulse 80    Temp 97.5 °F (36.4 °C)    Resp 16    Ht 5' 8\" (1.727 m)    Wt 113.4 kg (250 lb)    SpO2 100%    BMI 38.01 kg/m2       Physical Exam:  Pt is awake and has dementia . Heart reg rate and rhythm    Lungs fair breath sounds heard   Abdomen soft and no masses felt . Lab/Data Reviewed:  BMP: No results found for: NA, K, CL, CO2, AGAP, GLU, BUN, CREA, GFRAA, GFRNA  CMP: No results found for: NA, K, CL, CO2, AGAP, GLU, BUN, CREA, GFRAA, GFRNA, CA, MG, PHOS, ALB, TBIL, TP, ALB, GLOB, AGRAT, SGOT, ALT, GPT  CBC: No results found for: WBC, HGB, HGBEXT, HCT, HCTEXT, PLT, PLTEXT, HGBEXT, HCTEXT, PLTEXT        Assessment/Plan     Principal Problem:    Pneumonia (3/23/2017)    Active Problems:    Bladder cancer ([facility] (6/10/2014)      Chronic a-fib ([facility] (9/8/2014)      CAD (coronary artery disease) (9/8/2014)      HTN (hypertension) (9/8/2014)      DM (diabetes mellitus) ([facility] (9/8/2014)      Hyperlipidemia (9/8/2014)        Plan:will follow .  Continue the rocephin

## 2017-03-25 NOTE — PROGRESS NOTES
Pt in sitting resting on the recliner alert and oriented x 3 denies pain at the moment. Assessement completed plan of care for the shift explained pt verbalized understanding., HOB elevated, bed low and in locked position. Call light and frequently used items within reach. Pt instructed to call for assistance. Will continue tomonitor. 2300- pt assisted to bed made comfortable. 0600- Pt given a bath and them out of bed to chair resting well . No events to report.

## 2017-03-26 NOTE — ROUTINE PROCESS
Bedside and Verbal shift change report given to Letty Walter RN (oshncoming nurse) by Madelyn Rothman RN (offgoing nurse). Report included the folowing information SBAR, Kardex, Intake/Output, MAR and Recent Results.

## 2017-03-26 NOTE — PROGRESS NOTES
Progress Note      Patient: Jamilah Cohen               Sex: male          DOA: 3/23/2017       YOB: 1940      Age:  64 y.o.        LOS:  LOS: 3 days               Subjective:   Chest x ray shows a right middle lobe pneumonia . Pt is on rocephin and inhalers he is afebrile . The pt has dementia and lives with a relative . discussed with his daughter . Will put the pt on aricept  . Ct scan of the head  shows periventricular white matter disease. Pt has a long h/o cigarette smoking . Discussed with his daughter . Probably can go home with antibiotics and inhalers in a couple of days .         Objective:      Visit Vitals    /68    Pulse 64    Temp 97.6 °F (36.4 °C)    Resp 16    Ht 5' 8\" (1.727 m)    Wt 113.4 kg (250 lb)    SpO2 94%    BMI 38.01 kg/m2       Physical Exam:  Pt is awake and is responsive  Heart reg rate and rhythm   Lungs scattered rhonchi on the right    abdomen soft and nontender   Neuro  Mild dementia         Lab/Data Reviewed:  CMP:   Lab Results   Component Value Date/Time     03/26/2017 04:35 AM    K 3.6 03/26/2017 04:35 AM     03/26/2017 04:35 AM    CO2 24 03/26/2017 04:35 AM    AGAP 13 03/26/2017 04:35 AM    GLU 82 03/26/2017 04:35 AM    BUN 27 (H) 03/26/2017 04:35 AM    CREA 1.22 03/26/2017 04:35 AM    GFRAA >60 03/26/2017 04:35 AM    GFRNA 57 (L) 03/26/2017 04:35 AM    CA 7.5 (L) 03/26/2017 04:35 AM    ALB 3.1 (L) 03/26/2017 04:35 AM    TP 6.1 (L) 03/26/2017 04:35 AM    GLOB 3.0 03/26/2017 04:35 AM    AGRAT 1.0 03/26/2017 04:35 AM    SGOT 34 03/26/2017 04:35 AM    ALT 19 03/26/2017 04:35 AM     CBC:   Lab Results   Component Value Date/Time    WBC 4.5 (L) 03/26/2017 04:35 AM    HGB 11.4 (L) 03/26/2017 04:35 AM    HCT 38.0 03/26/2017 04:35 AM     03/26/2017 04:35 AM           Assessment/Plan     Principal Problem:    Pneumonia (3/23/2017)    Active Problems:    Bl[facility] ([facility] (6/10/2014)      Chronic a-fib ([facility]) (9/8/2014)      CAD (coronary artery disease) (9/8/2014)      HTN (hypertension) (9/8/2014)      DM (diabetes mellitus) ([facility] (9/8/2014)      Hyperlipidemia (9/8/2014)  Right middle lobe pneumonia  dementia    Plan:put on aricept . Need to be sure he will take his meds and antibiotics at home .  Will put him on   Inhalers and will need to determine if he is home o2 dependent

## 2017-03-26 NOTE — PROGRESS NOTES
Assumed patient care. Patient is in a recliner, awake, alert to self only. On O2 @3L/min. Patient denies any pain / discomfort. Not in acute distress.

## 2017-03-26 NOTE — ROUTINE PROCESS
Bedside shift change report given to Raye Ormond (oncoming nurse) by Leslie Hendricks (offgoing nurse). Report included the following information SBAR, Kardex, Intake/Output, MAR and Recent Results.

## 2017-03-26 NOTE — PROGRESS NOTES
Problem: Mobility Impaired (Adult and Pediatric)  Goal: *Acute Goals and Plan of Care (Insert Text)  Physical Therapy Goals  Initiated 3/26/2017 and to be accomplished within 7 day(s)  1. Patient will move from supine to sit and sit to supine , scoot up and down and roll side to side in bed with supervision/set-up. 2. Patient will transfer from bed to chair and chair to bed with supervision/set-up using the least restrictive device. 3. Patient will perform sit to stand with supervision/set-up. 4. Patient will ambulate with supervision/set-up for >/= 100 feet with the least restrictive device. 5. Patient will ascend/descend 4 stairs with 1 handrail(s) with supervision/set-up. PHYSICAL THERAPY EVALUATION     Patient: Mel Joe (35 y.o. male)  Date: 3/26/2017  Primary Diagnosis: Pneumonia        Precautions:          ASSESSMENT :  Based on the objective data described below, the patient presents with c/o weakness, decreased bed mobility independence, decreased transfer independence, decreased gait independence, decreased balance and decreased safety awareness. Patient with noted confusion which may hinder progress with therapy. Patient will benefit from skilled intervention to address the above impairments.   Patients rehabilitation potential is considered to be Fair  Factors which may influence rehabilitation potential include:   [ ]         None noted  [X]         Mental ability/status  [X]         Medical condition  [ ]         Home/family situation and support systems  [X]         Safety awareness  [ ]         Pain tolerance/management  [ ]         Other:      Recommendations for nursing:  Written on communication board: OOB to chair 2-3x/day for meals with Assist of 1  Verbally communicated to: nurse [facility] :  Recommendations and Planned Interventions:  [X]           Bed Mobility Training             [ ]    Neuromuscular Re-Education  [X]           Transfer Training [ ]    Orthotic/Prosthetic Training  [X]           Gait Training                          [ ]    Modalities  [ ]           Therapeutic Exercises          [ ]    Edema Management/Control  [ ]           Therapeutic Activities            [X]    Patient and Family   Training/Education  [ ]           Other (comment): Plan of care. Reminded not to try to get up without assistance for safety. Verbalized agreement. Frequency/Duration: Patient will be followed by physical therapy 1 time per day/3-5 days per week to address goals. Discharge Recommendations: Skilled Nursing Facility  Further Equipment Recommendations for Discharge: N/A, per wife has a rolling walker at home that patient can use. SUBJECTIVE:   Patient stated I need to go to the restroom to have a bowel movement.       OBJECTIVE DATA SUMMARY:       Past Medical History:   Diagnosis Date    A-fib [facility])      Bladder cancer ([address]       TURBT in 2010    CAD (coronary artery disease)      COPD (chronic obstructive pulmonary disease) ([address]      Coronary artery disease      Dementia      Diabetes ([address]      Diabetes ([address]      Diarrhea, unspecified      Encounter for postoperative care      Feeling of incomplete bladder emptying      GERD (gastroesophageal reflux disease)      History of kidney stones      History of prostate cancer      Hypercholesterolemia      Hyperlipidemia      Hypertension      Intermittent urinary stream      Kidney stone      Malignant neoplasm of other specified sites of bladder ([address]      Malignant neoplasm of prostate ([address]      Malignant neoplasm of urinary bladder ([address]      Nocturia      Pacemaker      Personal history of urinary calculi      Prostate CA ([address]      Prostate cancer ([address]       s/p seed implant and XRT    Urinary frequency      Urinary urgency       Past Surgical History:   Procedure Laterality Date    CARDIAC SURG PROCEDURE UNLIST   1989     HX.  OF BYPASS GRAFT    HX OTHER SURGICAL         BIOPSY PROSTATE    HX PACEMAKER   08/12/2015     Pulse generator replacement. Dr. Kvng Izquierdo.  HX PACEMAKER PLACEMENT   09/25/2008     Barriers to Learning/Limitations: yes;  altered mental status (i.e.Sedation, Confusion)  Compensate with: visual, verbal, tactile, kinesthetic cues/model     G CODE:Mobility  Current  CL= 60-79%   Goal  CJ= 20-39%. The severity rating is based on the Other [address]ame Standing Balance Scale  0: Pt performs 25% or less of standing activity (Max assist) CN, 100% impaired. 1: Pt supports self with upper extremities but requires therapist assistance. Pt performs 25-50% of effort (Mod assist) CM, 80% to <100% impaired. 1+: Pt supports self with upper extremities but requires therapist assistance. Pt performs >50% effort. (Min assist). CL, 60% to <80% impaired. 2: Pt supports self independently with both upper extremities (walker, crutches, parallel bars). CL, 60% to <80% impaired. 2+: Pt support self independently with 1 upper extremity (cane, crutch, 1 parallel bar). CK, 40% to <60% impaired. 3: Pt stands without upper extremity support for up to 30 seconds. CK, 40% to <60% impaired. 3+: Pt stands without upper extremity support for 30 seconds or greater. CJ, 20% to <40% impaired. 4: Pt independently moves and returns center of gravity 1-2 inches in one plane. CJ, 20% to <40% impaired. 4+: Pt independently moves and returns center of gravity 1-2 inches in multiple planes. CI, 1% to <20% impaired. 5: Pt independently moves and returns center of gravity in all planes greater than 2 inches. CH, 0% impaired.      Eval Complexity: History: MEDIUM  Complexity : 1-2 comorbidities / personal factors will impact the outcome/ POC Exam:MEDIUM Complexity : 3 Standardized tests and measures addressing body structure, function, activity limitation and / or participation in recreation  Presentation: LOW Complexity : Stable, uncomplicated  Clinical Decision Making:Low Complexity [facility] Standing Balance Scale Overall Complexity:LOW      Prior Level of Function/Home Situation: Ambulatory without any assistive device. Still was driving according to wife  Home Situation  Home Environment: Private residence  # Steps to Enter: 3  One/Two Story Residence: One story  Living Alone: No  Support Systems: Child(bahman), Family member(s)  Patient Expects to be Discharged to[de-identified] Private residence  Current DME Used/Available at Home: Oxygen, portable  Critical Behavior:  Neurologic State: Alert;Confused  Orientation Level: Oriented to person;Oriented to place  Cognition: Decreased attention/concentration; Follows commands;Poor safety awareness  Psychosocial  Purposeful Interaction: Yes  Pt Identified Daily Priority: Clinical issues (comment)  Caritas Process: Nurture loving kindness; Attend basic human needs;Establish trust  Caring Interventions: Reassure  Reassure: Prayer; Therapeutic listening;Caring rounds  Therapeutic Modalities: Intentional therapeutic touch  Skin Condition/Temp: Dry  Skin Integrity: Other (comment) (discoloration to BLE, redness to genital area)  Skin Integumentary  Skin Color: Appropriate for ethnicity  Skin Condition/Temp: Dry  Skin Integrity: Other (comment) (discoloration to BLE, redness to genital area)  Turgor: Non-tenting  Hair Growth: Present  Varicosities: Absent     Strength:    Strength: Generally decreased, functional (both LE)      Tone & Sensation:   Tone: Normal       Range Of Motion:  AROM: Generally decreased, functional (both LE)      Functional Mobility:  Bed Mobility:  Rolling: Minimum assistance  Supine to Sit: Minimum assistance  Transfers:  Sit to Stand: Minimum assistance  Stand to Sit: Contact guard assistance  Balance:   Sitting - Static: Good (unsupported)  Sitting - Dynamic: Fair (occasional) (Plus)  Standing: With support  Standing - Static: Fair  Standing - Dynamic : Fair (Minus)  Ambulation/Gait Training:  Distance (ft): 7 Feet (ft) (+ 7 feet)  Assistive Device: Walker, rolling  Ambulation - Level of Assistance: Contact guard assistance  Base of Support: Center of gravity altered  Speed/[place]: Pace decreased (<100 feet/min)  Step Length: Left shortened;Right shortened     Pain:  Pre treatment pain level: 0  Post treatment pain level: 0  Pain Scale 1: Numeric (0 - 10)  Pain Intensity 1: 0      Activity Tolerance:   Poor +, easily SOB with exertion. SpO2 checked 93% with O2 per nasal cannula @ 3 l/min. Please refer to the flowsheet for vital signs taken during this treatment. After treatment:   [X]         Patient left in no apparent distress sitting up in chair  [ ]         Patient left in no apparent distress in bed  [X]         Call bell left within reach  [X]         Nursing notified  [X]         Wife present  [ ]         Bed alarm activated      COMMUNICATION/EDUCATION:   [X]         Fall prevention education was provided and the patient/caregiver indicated understanding. [X]         Patient/family have participated as able in goal setting and plan of care. [X]         Patient/family agree to work toward stated goals and plan of care. [ ]         Patient understands intent and goals of therapy, but is neutral about his/her participation. [ ]         Patient is unable to participate in goal setting and plan of care.      Thank you for this referral.  Pau Saenz, PT   Time Calculation: 24 mins

## 2017-03-27 NOTE — ROUTINE PROCESS
Bedside and Verbal shift change report given to Drew Connolly RN (oshncoming nurse) by Ceferino Oakley RN (offgoing nurse). Report included the folowing information SBAR, Kardex, Intake/Output, MAR and Recent Results.

## 2017-03-27 NOTE — PROGRESS NOTES
Pt sitting on recliner resting alert and oriented to self, denies pain at the moment. Assessement completed plan of care for the shift explained pt verbalized understanding. , HOB elevated, bed low and in locked position. Call light and frequently used items within reach. 2300- pt assisted to bed made comfortable.

## 2017-03-27 NOTE — PROGRESS NOTES
[facility]  Hospitalist Division        Inpatient Daily Progress Note    Daily progress Note    Patient: Derek Jeff MRN: [id number]  CSN: [id number]    YOB: 1940  Age: 64 y.o. Sex: male    DOA: 3/23/2017 LOS:  LOS: 4 days                    Chief Complaint:  Shortness of breath, AMS       Subjective:      Sitting in chair. Denies shortness of breath. No distress. Objective:      Visit Vitals    /64 (BP 1 Location: Left arm, BP Patient Position: Sitting)    Pulse 64    Temp 97.6 °F (36.4 °C)    Resp 22    Ht 5' 8\" (1.727 m)    Wt 113 kg (249 lb 1.6 oz)    SpO2 94%    BMI 37.88 kg/m2       Physical Exam:  General appearance: alert, cooperative, no distress, appears stated age  Head: Normocephalic, without obvious abnormality, atraumatic  Lungs: diminished bases bilaterally   Heart: regular rate and rhythm, S1, S2 normal, no murmur, click, rub or gallop  Abdomen: soft, non tender, non distended . Normoactive bowel sounds. No masses, no organomegaly  Extremities: extremities normal, atraumatic, no cyanosis.  Trace BLE eema   Skin: Skin color, texture, turgor normal. No rashes or lesions  Neurologic: moves all 4 extremities   PSY: appropriately behaved        Intake and Output:  Current Shift:  03/27 0701 - 03/27 1900  In: 120 [P.O.:120]  Out: -   Last three shifts:       Recent Results (from the past 24 hour(s))   GLUCOSE, POC    Collection Time: 03/26/17  5:47 PM   Result Value Ref Range    Glucose (POC) 150 (H) 70 - 110 mg/dL   GLUCOSE, POC    Collection Time: 03/26/17  9:00 PM   Result Value Ref Range    Glucose (POC) 129 (H) 70 - 110 mg/dL   PROTHROMBIN TIME + INR    Collection Time: 03/27/17  4:40 AM   Result Value Ref Range    Prothrombin time 21.4 (H) 11.5 - 15.2 sec    INR 2.0 (H) 0.8 - 1.2     CBC WITH AUTOMATED DIFF    Collection Time: 03/27/17  4:40 AM   Result Value Ref Range    WBC 5.7 4.6 - 13.2 K/uL    RBC 4.67 (L) 4.70 - 5.50 M/uL HGB 11.3 (L) 13.0 - 16.0 g/dL    HCT 37.9 36.0 - 48.0 %    MCV 81.2 74.0 - 97.0 FL    MCH 24.2 24.0 - 34.0 PG    MCHC 29.8 (L) 31.0 - 37.0 g/dL    RDW 15.7 (H) 11.6 - 14.5 %    PLATELET 855 339 - 327 K/uL    MPV 10.2 9.2 - 11.8 FL    NEUTROPHILS 65 40 - 73 %    LYMPHOCYTES 25 21 - 52 %    MONOCYTES 9 3 - 10 %    EOSINOPHILS 0 0 - 5 %    BASOPHILS 1 0 - 2 %    ABS. NEUTROPHILS 3.8 1.8 - 8.0 K/UL    ABS. LYMPHOCYTES 1.4 0.9 - 3.6 K/UL    ABS. MONOCYTES 0.5 0.05 - 1.2 K/UL    ABS. EOSINOPHILS 0.0 0.0 - 0.4 K/UL    ABS.  BASOPHILS 0.0 0.0 - 0.06 K/UL    DF AUTOMATED     METABOLIC PANEL, BASIC    Collection Time: 03/27/17  4:40 AM   Result Value Ref Range    Sodium 141 136 - 145 mmol/L    Potassium 3.6 3.5 - 5.5 mmol/L    Chloride 103 100 - 108 mmol/L    CO2 27 21 - 32 mmol/L    Anion gap 11 3.0 - 18 mmol/L    Glucose 96 74 - 99 mg/dL    BUN 29 (H) 7.0 - 18 MG/DL    Creatinine 1.19 0.6 - 1.3 MG/DL    BUN/Creatinine ratio 24 (H) 12 - 20      GFR est AA >60 >60 ml/min/1.73m2    GFR est non-AA 59 (L) >60 ml/min/1.73m2    Calcium 7.7 (L) 8.5 - 10.1 MG/DL   GLUCOSE, POC    Collection Time: 03/27/17  8:42 AM   Result Value Ref Range    Glucose (POC) 99 70 - 110 mg/dL   GLUCOSE, POC    Collection Time: 03/27/17 12:37 PM   Result Value Ref Range    Glucose (POC) 106 70 - 110 mg/dL           Lab Results   Component Value Date/Time    Glucose 96 03/27/2017 04:40 AM    Glucose 82 03/26/2017 04:35 AM    Glucose 109 03/24/2017 06:30 AM    Glucose 127 03/23/2017 08:10 AM    Glucose 162 09/22/2016 07:02 AM        Assessment/Plan:     Patient Active Problem List   Diagnosis Code    Bladder cancer ([facility] C67.9    Chronic a-fib (HCC) I48.2    CAD (coronary artery disease) I25.10    HTN (hypertension) I10    DM (diabetes mellitus) (HCC) E11.9    Hyperlipidemia E78.5    GERD (gastroesophageal reflux disease) K21.9    Prostate CA ([facility] C61    Acute respiratory failure with hypoxia ([facility]) J96.01    Bilateral pneumonia J18.9    Dementia F03.90    Diarrhea R19.7    History of kidney stones Z87.442    Pneumonia J18.9       A/P:  CAP: continue Azithromycin, Rocephin, supplemental O2  COPD: Duo-nebs, Brovana, Pulmicort  Acute  hypoxic respiratory failure  A-fib: Coumadin   HTN: Norvasc, Lopressor   CAD: ASA  DM: SSI and Levemir   GI prophylaxis: Protonix  DVT prophylaxis: Coumadin   PT/OT  Dispo: [facility] tomorrow        NESHA Lacy-BC  [address]  Pager:  [phone]  Office:  [phone]    I examined the patient , reviewed the history, labs, and radiology reports  independently. I have reviewed the NP documentation, agree with the documentation, medical decision making and treatment plan as outlined by my NP.     Shannon Richard MD

## 2017-03-27 NOTE — PROGRESS NOTES
PT recommends snf, spoke with pt, asked me to call dtr, his origanial plan  Was home. Left message for dtr mrs villar  To  Call me.

## 2017-03-27 NOTE — PROGRESS NOTES
Assumed patient care. Patient is awake, alert, confused. On [email or web address] going on per NC. Patient denies any pain/ discomfort at this time. Not in acute distress.

## 2017-03-27 NOTE — ROUTINE PROCESS
Bedside and Verbal shift change report given to Joanne Flynn RN (oncoming nurse) by David Castano RN (offgoing nurse). Report included the following information SBAR, Kardex, Intake/Output, MAR and Cardiac Rhythm Afib.

## 2017-03-27 NOTE — CDMP QUERY
Please clarify if this patient is being treated/managed for:    =>Acute  hypoxic respiratory failure      =>Other Explanation of clinical findings  =>Unable to Determine (no explanation of clinical findings)    The medical record reflects the following clinical findings, treatment, and risk factors:  =>77 yo male with PMH A fib, COPD,CAD, dementia, bladder cancer, GERD  =>Per ED note  \" Pt decompensated became more agitated noted hypoxic with sats 58 after pulled off all of leads etc placed on NRB and NC with improvement placed on high flow NC with improvement abg reviewed repeat cxr increased congestion\"  =>\"Pt in respiratory distress and is agitated. Placed on NRB +5 L nasal canula. Sats at 93. Respiratory call for high flow nasal canula\"  =>ABG 7.336 42.4  60  22.6  89 on high flow 02  =>LACTIC ACID POC MAX 4.8  =>High flow 02, albuterol nebs, pulmicort           Please clarify and document your clinical opinion in the progress notes and discharge summary including the definitive and/or presumptive diagnosis, (suspected or probable), related to the above clinical findings. Please include clinical findings supporting your diagnosis. If you DECLINE this query or would like to communicate with [facility], please utilize the \"[employer] message box\" at the TOP of the Progress Note on the right.       Thank you,    Maira Landon RN, [phone]  [address] [facility], [phone]  José Lagos RN [phone]

## 2017-03-28 NOTE — PROGRESS NOTES
completed follow up visit with patient in room 2114 at about 1120 this morning and a Spiritual assessment of patient was done. Patient says that he is hanging in there and doing all he can to stay ahead of the doctors so that they will sign a release form so he can go home. He says that the room is beginning to feel smaller and he needs to get back to his own bed as well.    Chaplains will continue to follow and will provide pastoral care on an as needed/requested basis    Chaplain Harmeet Reynaga   Board Certified [address] [place] [address]   [phone]

## 2017-03-28 NOTE — PROGRESS NOTES
Notified dtr pt continues to refuse tcc, snf. Will go home with hh. And home O2. O2 port tanks will be delivered to  hosp per jessie rader. dtr will come to  pt at around 6pm . She requests pt  Get  A bath before being dc'd.

## 2017-03-28 NOTE — ANCILLARY DISCHARGE INSTRUCTIONS
Patient and/or next of kin has been given the [facility] Important Message From Medicare About Your Rights\" letter and all questions were answered.

## 2017-03-28 NOTE — PROGRESS NOTES
O2 use  - patient SPO2 84% on room air at rest   - received off O2  - patient placed back on O2 at 5 lpm    - explained use,need and importance of use    - show SPO2 results with use and explained    Concerns: Poor compliance of use at home, chronic hypoxia maybe causing patient confusion    Plan: reinforce need and use here and at home. Check on patient. Discuss with RN.

## 2017-03-28 NOTE — MED STUDENT NOTES
CC: shortness of breath, cough, confusion    HPI: Mr Patricia Prince is a 63 y/o  M who presented to [facility] ED on 3/23 for altered mental state, cough, SOB, and subjective fever. This had been going on for 3 days. He was diagnosed through chest x-ray with R middle lobe CAP and lactic acidosis. He was started on IV Rocephin, IV Azithromycin, Budesonide, Arformoterol, albuterol-ipratroprium, and supplemental O2. Today he is sitting up in his chair and has no complaints. He denies SOB, fevers, or chest pain. He is alert and able to answer questions appropriately. PROS: Constitutional - Negative for fevers, chills  Cardiac - negative for chest pain  Respiratory - negative for cough, shortness of breath, wheezing  GI: Negative for anorexia, nausea, vomiting, diarrhea, constipation, abdominal pain  Psychiatric: Mood and affect are appropriate    PMH: Bladder Cancer  Chronic Atrial Fibrillation  Coronary Artery Disease  COPD  Hypertension  Diabetes Mellitus  Hyperlipidemia  Gastroesophageal reflux disease  Prostate Cancer  Dementia    PSH: Pacemaker 2008  Pacemaker pulse generator replacement 2015  Cardiac Bypass graft 1989  Prostate biopsy    Meds: Albuterol-Ipratropium 2.5mg-0.5mg/3 mL nebulizer q6 hours  Amlodipine 5mg tablet PO daily  Arformoterol 15mcg nebulizer BID  Aspirin delayed-release 81mg tablet PO daily  Azithromycin 500mg in 0.9% sodium chloride 250mL adv IV q24 hours  Budesonide 500mcg/2mL nebulizer suspension BID  Ceftriaxone 2g in 0.9% sodium chloride 50mL MBP IV q24 hours  Furosemide 40mg PO daily  Gabapentin 300mg capsule PO BID  Insulin detemir 23 units SubQ injection daily with dinner  Insulin lispro subQ injection QID before meals and nightly  Metoprolol tartrate 50mg tablet PO BID   Pantoprazole 40mg tablet PO daily before breakfast  Pravastatin 40mg tablet PO qbedtime  Warfarin 6mg tablet PO daily    Allergies: NKDA    Family History:  Mother - MI; Father - osteoarthritis    Social History: Former 45 pack year cigarette smoker. Quit 1989. Denies alcohol use. . Lives with spouse. Physical Exam:   Vitals 0618: /53 P 60 Temp 97.7F Resp 18 SpO2 95%  General: Alert and in no apparent distress. Sitting up in chair eating breakfast. Not ill-appearing  Heart: RRR with no extra sounds, murmurs, rubs, or gallops  Lungs: CTA bilaterally with no wheezes, crackles, or rhonchi. Resonant to percussion in all fields  Abdominal: Bowel sounds normoactive. Abdomen soft, tender, nondistended with no masses or organomegaly noted  Extremities: Trace bilateral pitting edema. Assessment:  DDx SOB:   Respiratory - pneumonia, bronchitis, COPD exacerbation, influenza, pneumothorax, pulmonary embolus, pleural effusion  Cardiac - CHF, pulmonary edema  Other - anemia, panic attack    DDx altered mental status:  Electrolytes - dehydration, hypokalemia/hyponatremia, hypoglycemia  Neuro - CVA, Trauma  Infection - pneumonia, UTI, sepsis  Other - substance abuse/withdrawal    Plan:  SOB:  Chest xray - pneumonia, PE, Pleural effusion, pneumothorax, CHF  CBC - infection, anemia  Rapid Flu test - Influenza    Altered Mental Status:  BMP - electrolytes  Drug Screen - substance abuse  Urinalysis - UTI  POC glucose test - hypoglycemia  CT head - CVA, trauma    Continue IV Rocephin, Azithromycin, supplemental O2 through nasal cannula PRN, and inhalers  Continue coumadin for AFib & DVT prophylaxis  Consider D/C to skilled nursing facility  *ATTENTION:  This note has been created by a medical student for educational purposes only. Please do not refer to the content of this note for clinical decision-making, billing, or other purposes. Please see attending physicians note to obtain clinical information on this patient. *

## 2017-03-28 NOTE — DISCHARGE SUMMARY
[facility]    Discharge Summary    Patient: Bina Salinas MRN: [id number]  CSN: [id number]    YOB: 1940  Age: 64 y.o. Sex: male    DOA: 3/23/2017 LOS:  LOS: 5 days   Discharge Date:      Admission Diagnoses: Pneumonia    Discharge Diagnoses:    Problem List as of 3/28/2017  Date Reviewed: 2/21/2017          Codes Class Noted - Resolved    * (Principal)Pneumonia ICD-10-CM: J18.9  ICD-9-CM: 363  3/23/2017 - Present        History of kidney stones ICD-10-CM: Z87.442  ICD-9-CM: V13.01  11/4/2016 - Present        Diarrhea ICD-10-CM: R19.7  ICD-9-CM: 787.91  10/5/2016 - Present        Acute respiratory failure with hypoxia ([facility] ICD-10-CM: J96.01  ICD-9-CM: 518.81  9/22/2016 - Present        Bilateral pneumonia ICD-10-CM: J18.9  ICD-9-CM: 486  9/22/2016 - Present        Dementia ICD-10-CM: F03.90  ICD-9-CM: 294.20  9/22/2016 - Present        Prostate CA ([facility] ICD-10-CM: C61  ICD-9-CM: 167  9/18/2016 - Present        Chronic a-fib ([facility] (Chronic) ICD-10-CM: I48.2  ICD-9-CM: 427.31  9/8/2014 - Present        CAD (coronary artery disease) (Chronic) ICD-10-CM: I25.10  ICD-9-CM: 414.00  9/8/2014 - Present        HTN (hypertension) (Chronic) ICD-10-CM: I10  ICD-9-CM: 401.9  9/8/2014 - Present        DM (diabetes mellitus) ([facility] (Chronic) ICD-10-CM: E11.9  ICD-9-CM: 250.00  9/8/2014 - Present        Hyperlipidemia (Chronic) ICD-10-CM: E78.5  ICD-9-CM: 272.4  9/8/2014 - Present        GERD (gastroesophageal reflux disease) (Chronic) ICD-10-CM: K21.9  ICD-9-CM: 530.81  9/8/2014 - Present        Bladder cancer ([facility] (Chronic) ICD-10-CM: C67.9  ICD-9-CM: 188.9  6/10/2014 - Present              Discharge Condition: Stable    Discharge To: Home with home health     Consults: [address] [facility] [address] Course: Bina Salinas is a 64 y.o. male who presented to the ED with complaints of cough, weakness, and SOB. This had been worsening at home for about 3 days.  There was subjective fever associated with his reported symptoms. The cough had been non-productive. He denied chest pain. In the ER, there is evidence for pneumonia with potential fluid overload. He ws admitted for ongoing management. Patient was treated with IV Azithromycin and Rocephin. Patient was evaluated by PT and recommended for SNF however the patient adamantly refused SNF. Patient was unable to be weaned from O2 and therefore was discharged with home O2. Patient's daughter was instructed to have patient follow up with PCP the first of next week. Patient is stable for discharge home with home health services.        Significant Diagnostic Studies:   Recent Results (from the past 24 hour(s))   GLUCOSE, POC    Collection Time: 03/27/17  8:57 PM   Result Value Ref Range    Glucose (POC) 143 (H) 70 - 110 mg/dL   PROTHROMBIN TIME + INR    Collection Time: 03/28/17  3:51 AM   Result Value Ref Range    Prothrombin time 24.3 (H) 11.5 - 15.2 sec    INR 2.3 (H) 0.8 - 1.2     GLUCOSE, POC    Collection Time: 03/28/17  8:05 AM   Result Value Ref Range    Glucose (POC) 96 70 - 110 mg/dL   GLUCOSE, POC    Collection Time: 03/28/17 12:18 PM   Result Value Ref Range    Glucose (POC) 163 (H) 70 - 110 mg/dL   GLUCOSE, POC    Collection Time: 03/28/17  5:03 PM   Result Value Ref Range    Glucose (POC) 130 (H) 70 - 110 mg/dL         Current Discharge Medication List      START taking these medications    Details   amoxicillin-clavulanate (AUGMENTIN) 875-125 mg per tablet Take 1 Tab by mouth two (2) times a day for 5 days. Qty: 10 Tab, Refills: 0         CONTINUE these medications which have NOT CHANGED    Details   albuterol-ipratropium (DUO-NEB) 2.5 mg-0.5 mg/3 ml nebu 3 mL by Nebulization route every four (4) hours as needed. Qty: 30 Nebule, Refills: 1      metoprolol tartrate (LOPRESSOR) 50 mg tablet     Associated Diagnoses: Malignant neoplasm of urinary bladder, unspecified site (HCC)      Omeprazole delayed release (PRILOSEC D/R) 20 mg tablet Take 20 mg by mouth daily. fluticasone-salmeterol (ADVAIR) 250-50 mcg/dose diskus inhaler Take 1 Puff by inhalation two (2) times a day. Qty: 1 Inhaler, Refills: 0      omega-3 fatty acids-vitamin e (FISH OIL) 1,000 mg cap Take 1 Cap by mouth two (2) times a day.      gabapentin (NEURONTIN) 300 mg capsule Take 300 mg by mouth two (2) times a day. metformin (GLUCOPHAGE) 1,000 mg tablet Take 1,000 mg by mouth two (2) times daily (with meals). pravastatin (PRAVACHOL) 40 mg tablet 40 mg nightly. amLODIPine (NORVASC) 5 mg tablet Take 5 mg by mouth daily. furosemide (LASIX) 40 mg tablet Take  by mouth daily. olmesartan (BENICAR) 40 mg tablet Take  by mouth daily. aspirin 81 mg tablet Take 81 mg by mouth.        warfarin (COUMADIN) 5 mg tablet Take 6 mg by mouth daily. Activity: PT/OT per Home Health    Diet: Resume previous diet    Wound Care: None needed    Follow-up: PCP in 1 week     Discharge time: 50 minutes   Tess Curran NP  3/28/2017, 1:44 PM      I examined the patient , reviewed the history, labs, and radiology reports  independently. I have reviewed the NP documentation, agree with the documentation, medical decision making and treatment plan as outlined by my NP.     Paulo Zimmerman MD

## 2017-03-28 NOTE — PROGRESS NOTES
Bedside and Verbal shift change report given to Anaya Gillespie RN (oncoming nurse) by Andry Batres RN (offgoing nurse). Report included the following information SBAR, Kardex, Intake/Output, MAR and Recent Results.

## 2017-03-28 NOTE — PROGRESS NOTES
Care Management Interventions  PCP Verified by CM: Yes  Palliative Care Consult (Criteria: CHF and RRAT>21): No  Reason for No Palliative Care Consult: Other (see comment)  Mode of Transport at Discharge:  Other (see comment)  Transition of Care Consult (CM Consult): Discharge Planning, [address]: No  Reason Outside [place]: Out of service area ([address])  Discharge Durable Medical Equipment: Yes (Home [address])  Physical Therapy Consult: Yes  Occupational Therapy Consult: No  Speech Therapy Consult: No  Current Support Network: Lives with Spouse  Confirm Follow Up Transport: Family  Plan discussed with Pt/Family/Caregiver: Yes  Freedom of Choice Offered: Yes  Discharge Location  Discharge Placement: Home with home health

## 2017-03-28 NOTE — PROGRESS NOTES
Received bedside verbal report from Sister RN  Pt resting, no sign of distress. Call light within reach. 5- family here to pick pt up. Discharge instructions given. Verbalizes understanding    2000 discussed transport of o2 tanks from [employer] with charge nurse and nursing supervisor. 2020- call into [facility] to inquire about assist with transporting of oxygen tanks.  Awaiting call back

## 2017-03-28 NOTE — PROGRESS NOTES
Call to the pt's dgt, Leola Rear # [phone] as requested by the pt to discuss home . She chose [facility]. Referral sent to them via [facility] and called to their intake rep, Luc Cross. Ordered the pt's home 02 from [facility] . Portable tanks have been delivered to the hospital room. [place] [id number] office will contact the pt's dgt to coordinate delivery to the pt's home in [place].

## 2017-03-28 NOTE — PROGRESS NOTES
Spoke with pt tcc can accept, he refuses  Snf, called wife and dtr, dtr called and spoke with pt, continues to refuse. Spoke with him again, but  Still refusing. Notified dtr, called juliocesar, received hh orders. No

## 2017-03-28 NOTE — PROGRESS NOTES
Oxygen testing: patient has been on 5 LPM via NC. Patient was taken off oxygen and desaturated to 84%. Patient was placed back on 5 LMP/NC. Patient will require home O2.  Hernan Acevedo, MCKENNAP-BC

## 2017-03-28 NOTE — PROGRESS NOTES
Received bedside verbal report from Sister P RN  Pt resting, no sign of distress. Call light within reach.

## 2017-03-29 NOTE — PROGRESS NOTES
Discharged to home via car with dgt, Bonnie Santana and family members. Five oxygen tanks delivered to  Hospital room with patient on oxygen leaving building. Supervisor, Annel aware of oxygen tanks wrapped in thermal blankets discharged with patient. Family member spoke with Levon Figueredo from [employer] about transporting the oxygen tanks.

## 2017-05-26 NOTE — ED PROVIDER NOTES
HPI Comments: 4:06 PM Narcisa Burrows is a 64 y.o. male with h/o HTN, DM, CAD, COPD, A-fib, heart failure, prostate CA, and bladder CA who presents to ED for evaluation of shortness of breath onset 2-3 days ago. Patient was in PCP's office to get PT-INR recheck. His O2 sats at PCP office were in mid 80's. Patient was reportedly fatigued and short of breath at the office. He does admit to cough. Patient denies chest pain. He says, \"I feel 100%. \" Patient was treated for pneumonia this spring. Patient sleeps on four pillows, states he cannot catch his breath if he lays flat. No hx of PE or DVT. No other concerns or symptoms at this time. PCP: Pk Verduzco MD    The history is provided by the patient. Past Medical History:   Diagnosis Date    A-fib [facility])     Bladder cancer ([address]     TURBT in 2010    CAD (coronary artery disease)     COPD (chronic obstructive pulmonary disease) ([address]     Coronary artery disease     Dementia     Diabetes ([address]     Diabetes ([address]     Diarrhea, unspecified     Encounter for postoperative care     Feeling of incomplete bladder emptying     GERD (gastroesophageal reflux disease)     History of kidney stones     History of prostate cancer     Hypercholesterolemia     Hyperlipidemia     Hypertension     Intermittent urinary stream     Kidney stone     Lower urinary tract symptoms (LUTS)     Malignant neoplasm of other specified sites of bladder     Malignant neoplasm of prostate ([address]     Malignant neoplasm of urinary bladder (HCC)     Nocturia     Pacemaker     Personal history of urinary calculi     Prostate CA ([address]     Prostate cancer ([address]     s/p seed implant and XRT    Senile dementia     Urinary frequency     Urinary urgency        Past Surgical History:   Procedure Laterality Date    CARDIAC SURG PROCEDURE UNLIST  1989    HX.  OF BYPASS GRAFT    HX OTHER SURGICAL      BIOPSY PROSTATE    HX PACEMAKER  08/12/2015    Pulse generator replacement. Dr. Al Anderson.  HX PACEMAKER PLACEMENT  09/25/2008    HX UROLOGICAL  09/2016    CRMS. 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 Dr. Michael Apple.  HX UROLOGICAL Left 09/2014    Ureterorenoscopy with extensive laser ablation of stone. Family History:   Problem Relation Age of Onset    Heart Attack Mother     Arthritis-osteo Father        Social History     Social History    Marital status:      Spouse name: N/A    Number of children: N/A    Years of education: N/A     Occupational History    Not on file. Social History Main Topics    Smoking status: Former Smoker     Packs/day: 1.00     Years: 45.00     Types: Cigarettes     Quit date: 4/27/1989    Smokeless tobacco: Never Used    Alcohol use No    Drug use: No    Sexual activity: Not on file     Other Topics Concern    Not on file     Social History Narrative         ALLERGIES: Review of patient's allergies indicates no known allergies. Review of Systems   Constitutional: Positive for fatigue. Negative for chills and fever. HENT: Negative for congestion, rhinorrhea and sore throat. Respiratory: Positive for cough and shortness of breath. Cardiovascular: Negative for chest pain and leg swelling. Gastrointestinal: Negative for abdominal pain, diarrhea, nausea and vomiting. Genitourinary: Negative for dysuria and frequency. Musculoskeletal: Negative for arthralgias, back pain and myalgias. Skin: Negative for rash and wound. Neurological: Negative for dizziness, numbness and headaches. Vitals:    05/26/17 1606 05/26/17 1813   BP: 163/80 142/66   Pulse: 60 61   Resp: 19 16   Temp: 97.7 °F (36.5 °C) 98.2 °F (36.8 °C)   SpO2: 93% 93%   Weight: 112 kg (246 lb 14.6 oz)    Height: 5' 6\" (1.676 m)             Physical Exam   Constitutional:   General:  Well-developed, well-nourished, obese nontoxic nondiaphoretic speaking in full sentences.     Head: Normocephalic atraumatic. Eyes:  Pupils midrange extraocular movements intact. No pallor or conjunctival injection. Nose:  No rhinorrhea, inspection grossly normal.    Ears:  Grossly normal to inspection, no discharge. Mouth:  Mucous membranes moist, no appreciable intraoral lesion. Neck/Back:  Trachea midline, no asymmetry. Chest:  Grossly normal inspection, symmetric chest rise. Pulmonary: Diminished bibasilar no wheezing no rhonchi no focal lung sounds. Cardiovascular:  S1-S2 no murmurs rubs or gallops. Abdomen: Soft, nontender, nondistended no guarding rebound or peritoneal signs. Extremities:  Grossly normal to inspection, peripheral pulses intact  2+ pitting edema chronic skin changes, no asymmetry no pain on palpation of the calf  Neurologic:  Alert and oriented no appreciable focal neurologic deficit. Psychiatric:  Grossly normal mood and affect. Nursing note reviewed, vital signs reviewed. MDM  Number of Diagnoses or Management Options  Diagnosis management comments: ED course:  Patient presents with reportedly low oxygen saturation at primary care doctor's office and reportedly in the mid [de-identified]. He is is the back at this time feeling \"100% \", examination concerning for decompensated heart failure. We'll send labs and monitor        EKG done at 1620 hrs.: Ventricular pacemaker LEFT bundle branch block morphology, no ST changes or T-wave abnormality     Chest x-ray per my interpretation persistent opacity RIGHT lower lung field, he does have swollen legs but his heart failure markers not elevated, primary concern is for ruling out PE, will pursue a CTA chest     It is now the end of my shift, I am still awaiting CTA. Patient will be signed out to the oncoming physician Dr. Alba Matos at [phone]. Disposition:     Pending        Portions of this chart were created with Dragon medical speech to text program.   Unrecognized errors may be present.       ED Course Procedures      Vitals:  Patient Vitals for the past 12 hrs:   Temp Pulse Resp BP SpO2   05/26/17 1813 98.2 °F (36.8 °C) 61 16 142/66 93 %   05/26/17 1606 97.7 °F (36.5 °C) 60 19 163/80 93 %        Medications ordered:   Medications   furosemide (LASIX) injection 40 mg (40 mg IntraVENous Given 5/26/17 1722)         Lab findings:  Recent Results (from the past 12 hour(s))   EKG, 12 LEAD, INITIAL    Collection Time: 05/26/17  4:20 PM   Result Value Ref Range    Ventricular Rate 63 BPM    Atrial Rate 74 BPM    QRS Duration 178 ms    Q-T Interval 492 ms    QTC Calculation (Bezet) 503 ms    Calculated R Axis -57 degrees    Calculated T Axis 111 degrees    Diagnosis       Wide QRS rhythm  Left axis deviation  Left bundle branch block  Abnormal ECG  When compared with ECG of 23-MAR-2017 09:04,  Previous ECG has undetermined rhythm, needs review     CBC WITH AUTOMATED DIFF    Collection Time: 05/26/17  4:50 PM   Result Value Ref Range    WBC 8.4 4.6 - 13.2 K/uL    RBC 4.84 4.70 - 5.50 M/uL    HGB 11.7 (L) 13.0 - 16.0 g/dL    HCT 38.1 36.0 - 48.0 %    MCV 78.7 74.0 - 97.0 FL    MCH 24.2 24.0 - 34.0 PG    MCHC 30.7 (L) 31.0 - 37.0 g/dL    RDW 18.8 (H) 11.6 - 14.5 %    PLATELET 495 768 - 925 K/uL    MPV 9.9 9.2 - 11.8 FL    NEUTROPHILS 55 40 - 73 %    LYMPHOCYTES 32 21 - 52 %    MONOCYTES 10 3 - 10 %    EOSINOPHILS 2 0 - 5 %    BASOPHILS 1 0 - 2 %    ABS. NEUTROPHILS 4.7 1.8 - 8.0 K/UL    ABS. LYMPHOCYTES 2.7 0.9 - 3.6 K/UL    ABS. MONOCYTES 0.8 0.05 - 1.2 K/UL    ABS. EOSINOPHILS 0.1 0.0 - 0.4 K/UL    ABS.  BASOPHILS 0.0 0.0 - 0.06 K/UL    DF AUTOMATED     METABOLIC PANEL, BASIC    Collection Time: 05/26/17  4:50 PM   Result Value Ref Range    Sodium 142 136 - 145 mmol/L    Potassium 3.7 3.5 - 5.5 mmol/L    Chloride 107 100 - 108 mmol/L    CO2 30 21 - 32 mmol/L    Anion gap 5 3.0 - 18 mmol/L    Glucose 120 (H) 74 - 99 mg/dL    BUN 16 7.0 - 18 MG/DL    Creatinine 1.00 0.6 - 1.3 MG/DL    BUN/Creatinine ratio 16 12 - 20 GFR est AA >60 >60 ml/min/1.73m2    GFR est non-AA >60 >60 ml/min/1.73m2    Calcium 8.7 8.5 - 10.1 MG/DL   CARDIAC PANEL,(CK, CKMB & TROPONIN)    Collection Time: 05/26/17  4:50 PM   Result Value Ref Range    CK 75 39 - 308 U/L    CK - MB 1.6 <3.6 ng/ml    CK-MB Index 2.1 0.0 - 4.0 %    Troponin-I, Qt. <0.02 0.0 - 0.045 NG/ML   PRO-BNP    Collection Time: 05/26/17  4:50 PM   Result Value Ref Range    NT pro-BNP 1602 0 - 1800 PG/ML   MAGNESIUM    Collection Time: 05/26/17  4:50 PM   Result Value Ref Range    Magnesium 2.3 1.6 - 2.6 mg/dL   PROTHROMBIN TIME + INR    Collection Time: 05/26/17  4:50 PM   Result Value Ref Range    Prothrombin time 21.5 (H) 11.5 - 15.2 sec    INR 2.0 (H) 0.8 - 1.2         EKG interpretation by ED Physician:       X-Ray, CT or other radiology findings or impressions:  XR CHEST PA LAT    (Results Pending)   CTA CHEST W OR W WO CONT    (Results Pending)       Orders:  Orders Placed This Encounter    XR CHEST PA LAT     Standing Status:   Standing     Number of Occurrences:   1     Order Specific Question:   Transport     Answer:   Stretcher [5]     Order Specific Question:   Reason for Exam     Answer:   dyspnea    CTA CHEST W OR W WO CONT     Standing Status:   Standing     Number of Occurrences:   1     Order Specific Question:   Transport     Answer:   Stretcher [5]     Order Specific Question:   Is Patient Allergic to Contrast Dye? Answer:   Unknown     Order Specific Question:   Does patient have history of Renal Disease?      Answer:   No    CBC WITH AUTOMATED DIFF     Standing Status:   Standing     Number of Occurrences:   1    METABOLIC PANEL, BASIC     Standing Status:   Standing     Number of Occurrences:   1    CARDIAC PANEL,(CK, CKMB & TROPONIN)     Standing Status:   Standing     Number of Occurrences:   1    PRO-BNP     Standing Status:   Standing     Number of Occurrences:   1    MAGNESIUM     Standing Status:   Standing     Number of Occurrences:   1    PROTHROMBIN TIME + INR     Standing Status:   Standing     Number of Occurrences:   1    EKG, 12 LEAD, INITIAL     Standing Status:   Standing     Number of Occurrences:   1     Order Specific Question:   Reason for Exam:     Answer:   dyspnea    furosemide (LASIX) injection 40 mg       Reevaluation, Progress notes, Consult notes, or additional Procedure notes:       Disposition:  Diagnosis: No diagnosis found. Disposition:     Follow-up Information     None           Patient's Medications   Start Taking    No medications on file   Continue Taking    ALBUTEROL-IPRATROPIUM (DUO-NEB) 2.5 MG-0.5 MG/3 ML NEBU    3 mL by Nebulization route every four (4) hours as needed. AMLODIPINE (NORVASC) 5 MG TABLET    Take 5 mg by mouth daily. ASPIRIN 81 MG TABLET    Take 81 mg by mouth. FLUTICASONE-SALMETEROL (ADVAIR) 250-50 MCG/DOSE DISKUS INHALER    Take 1 Puff by inhalation two (2) times a day. FUROSEMIDE (LASIX) 40 MG TABLET    Take  by mouth daily. GABAPENTIN (NEURONTIN) 300 MG CAPSULE    Take 300 mg by mouth two (2) times a day. METFORMIN (GLUCOPHAGE) 1,000 MG TABLET    Take 1,000 mg by mouth two (2) times daily (with meals). METOPROLOL TARTRATE (LOPRESSOR) 50 MG TABLET        OLMESARTAN (BENICAR) 40 MG TABLET    Take  by mouth daily. OMEGA-3 FATTY ACIDS-VITAMIN E (FISH OIL) 1,000 MG CAP    Take 1 Cap by mouth two (2) times a day. PRAVASTATIN (PRAVACHOL) 40 MG TABLET    40 mg nightly. WARFARIN (COUMADIN) 5 MG TABLET    Take 6 mg by mouth daily. These Medications have changed    No medications on file   Stop Taking    No medications on file         [id number] [facility] for and in the presence of Yemi Gill MD (05/26/17)      Physician Attestation  I personally performed the services described in this documentation, reviewed and edited the documentation which was dictated to the scribe in my presence, and it accurately records my words and actions.     Mel Bray Krishan Roth MD (05/26/17)      Signed by: Jose Murray, May 26, 2017 at 7:10 PM

## 2017-05-26 NOTE — ED NOTES
Progress notes, Consult notes or additional Procedure notes:   7:31 PM Consulted with Dr. Kiarra Villarreal, ED attending concerning patient Keisha Morning. Standard discussion of reason for visit, HPI, ROS, PE, and current results available. Report was given at this time. I will assume care at this time. Pending CTA CHEST.     8:25 PM CTA CHEST shows no signs of PE, but it is suggestive of CHF. Pt doing ok, wants to go home. Pt has O2 at home at baseline, though only uses at night. Pt on 3L in ED with sats 95%. I offered admission, but pt is adamant that he wants to go home, even with family asking him to stay overnight. Discussed alternate plan for IV lasix in ED, double lasix at home, symptom management, follow-up, return precautions. 8:33 PM I have reassessed the patient and discussed their results and diagnosis. Pt will be discharged in stable condition. Patient is to return to emergency department if any new or worsening condition. Patient understands and verbalizes agreement with plan. Disposition:  Diagnosis:   1. Hypoxia        Disposition: Discharged    Follow-up Information     Follow up With Details Comments Contact Info    Ronit Connolly MD Schedule an appointment as soon as possible for a visit follow up [id number]  [phone]      [facility] EMERGENCY DEPT Go to As needed, If symptoms worsen [address]  [phone]          LETICIAIBE ATTESTATION STATEMENT  Documented by: Allyson Kline for, and in the presence of, Nehemias Garg MD 7:33 PM    Signed by: Jose Plascencia, 05/26/17 7:33 PM    PROVIDER ATTESTATION STATEMENT  I personally performed the services described in the documentation, reviewed the documentation, as recorded by the scribe in my presence, and it accurately and completely records my words and actions.   Nehemias Garg MD

## 2017-05-27 NOTE — DISCHARGE INSTRUCTIONS
Learning About Hypoxemia  What is hypoxemia? Hypoxemia means that you don't have enough oxygen in your blood. It's a result of diseases that affect your heart or lungs. These include heart failure, COPD, and pulmonary fibrosis (scarring of the lungs). Being at high altitudes can also lead to hypoxemia. What happens when you have hypoxemia? Oxygen gets into your blood through your lungs. Your blood carries the oxygen to all parts of your body. When you have too little oxygen in your blood, your body doesn't get enough of it. With too little oxygen, your heart and other parts of your body don't work very well. What are the symptoms? In addition to the symptoms of whatever is causing your hypoxemia, you may:  · Get tired quickly. · Be short of breath when you are active. · Feel like your heart is pounding or racing. · Feel weak or dizzy. · Become confused. How is hypoxemia treated? Your doctor will do tests to find out how much oxygen is in your blood. He or she will look for the cause of your hypoxemia and treat that problem. For example, if you have heart failure, you may need medicines that help your heart pump better. · If your hypoxemia is not severe, your doctor may give you oxygen through a mask or nasal cannula (say \"SANTY-courtneyh-hanna\"). A cannula is a thin tube with two openings that fit just inside your nose. · If your hypoxemia is severe, you may have a breathing tube put into your windpipe. The breathing tube is attached to a machine that pushes air into your lungs. This machine is called a ventilator. · If you have a long-term problem with hypoxemia, your doctor may recommend that you use oxygen regularly. Some people need it all the time. Others need it from time to time throughout the day or overnight. Your doctor will tell you how much oxygen you need and how often to use it. Follow-up care is a key part of your treatment and safety.  Be sure to make and go to all appointments, and call your doctor if you are having problems. It's also a good idea to know your test results and keep a list of the medicines you take. Where can you learn more? Go to http://elizabeth-drew.info/. Enter M375 in the search box to learn more about \"Learning About Hypoxemia. \"  Current as of: May 23, 2016  Content Version: 11.2  © 8562-4698 [employer] Care instructions adapted under license by [employer] (which disclaims liability or warranty for this information). If you have questions about a medical condition or this instruction, always ask your healthcare professional. [place] [address] any warranty or liability for your use of this information.

## 2017-05-27 NOTE — ED NOTES
I have reviewed discharge instructions with the patient. The patient verbalized understanding. Patient armband removed and shredded.   Pt d/c'd to home by [facility] RN pt awake, alert and in NAD

## 2017-08-28 PROBLEM — N10 ACUTE PYELONEPHRITIS: Status: ACTIVE | Noted: 2017-01-01

## 2017-08-28 PROBLEM — N20.1 LEFT URETERAL CALCULUS: Status: ACTIVE | Noted: 2017-01-01

## 2017-08-28 NOTE — IP AVS SNAPSHOT
[address] [address] [address] 
[phone] Patient: Shanice Burns MRN: [id number] XEL:[id number]/35/7502 You are allergic to the following No active allergies Recent Documentation Height Weight BMI Smoking Status 1.727 m 111.3 kg 37.3 kg/m2 Former Smoker Unresulted Labs Order Current Status CULTURE, URINE In process CULTURE, URINE In process Emergency Contacts Name Discharge Info Relation Home Work Mobile [address] CAREGIVER [3] Daughter [21] [phone] Kev Tong  Child [2] [phone] About your hospitalization You were admitted on:  August 28, 2017 You last received care in the:  [facility] PHASE 2 RECOVERY You were discharged on:  August 28, 2017 Unit phone number:  [phone] Why you were hospitalized Your primary diagnosis was:  Not on File Your diagnoses also included:  Left Ureteral Calculus, Acute Pyelonephritis Providers Seen During Your Hospitalizations Provider Role Specialty Primary office phone Nabila Graham MD Attending Provider Urology [phone] Your Primary Care Physician (PCP) Primary Care Physician Office Phone Office Fax [facility] [phone] Follow-up Information Follow up With Details Comments Contact Info Kathy Castano MD   [address] [place] [id number] [phone] Current Discharge Medication List  
  
CONTINUE these medications which have NOT CHANGED Dose & Instructions Dispensing Information Comments Morning Noon Evening Bedtime  
 amoxicillin 500 mg capsule Commonly known as:  AMOXIL Your last dose was: Your next dose is:    
   
   
 Dose:  500 mg Take 1 Cap by mouth three (3) times daily. Quantity:  30 Cap Refills:  0 aspirin 81 mg tablet Your last dose was: Your next dose is:    
   
   
 Dose:  81 mg Take 81 mg by mouth. Refills:  0 BENICAR 40 mg tablet Generic drug:  olmesartan Your last dose was: Your next dose is: Take  by mouth daily. Refills:  0  
     
   
   
   
  
 COUMADIN 5 mg tablet Generic drug:  warfarin Your last dose was: Your next dose is:    
   
   
 Dose:  6 mg Take 6 mg by mouth daily. Refills:  0  
     
   
   
   
  
 FISH OIL 1,000 mg Cap Generic drug:  omega-3 fatty acids-vitamin e Your last dose was: Your next dose is:    
   
   
 Dose:  1 Cap Take 1 Cap by mouth two (2) times a day. Refills:  0  
     
   
   
   
  
 furosemide 40 mg tablet Commonly known as:  LASIX Your last dose was: Your next dose is: Take  by mouth daily. Refills:  0  
     
   
   
   
  
 metFORMIN 1,000 mg tablet Commonly known as:  GLUCOPHAGE Your last dose was: Your next dose is:    
   
   
 Dose:  1000 mg Take 1,000 mg by mouth two (2) times daily (with meals). Refills:  0  
     
   
   
   
  
 metoprolol tartrate 50 mg tablet Commonly known as:  LOPRESSOR Your last dose was: Your next dose is:    
   
   
  Refills:  0 NEURONTIN 300 mg capsule Generic drug:  gabapentin Your last dose was: Your next dose is:    
   
   
 Dose:  300 mg Take 300 mg by mouth two (2) times a day. Refills:  0 NORVASC 5 mg tablet Generic drug:  amLODIPine Your last dose was: Your next dose is:    
   
   
 Dose:  5 mg Take 5 mg by mouth daily. Refills:  0  
     
   
   
   
  
 potassium chloride 20 mEq tablet Commonly known as:  K-DUR, KLOR-CON Your last dose was: Your next dose is:    
   
   
 Dose:  1 Tab Take 1 Tab by mouth daily. Refills:  0  
     
   
   
   
  
 pravastatin 40 mg tablet Commonly known as:  PRAVACHOL Your last dose was: Your next dose is:    
   
   
 Dose:  40 mg  
40 mg nightly. Refills:  0 Discharge Instructions Cystoscopy: [facility] at [facility] Your Recovery A cystoscopy is a procedure that lets a doctor look inside of the bladder and the urethra. The urethra is the tube that carries urine from the bladder to outside the body. The doctor uses a thin, lighted tool called a cystoscope. Your bladder is filled with fluid. This stretches the bladder so that your doctor can look closely at the inside of your bladder. After the cystoscopy, your urethra may be sore at first, and it may burn when you urinate for the first few days after the procedure. You may feel the need to urinate more often, and your urine may be pink. These symptoms should get better in 1 or 2 days. You will probably be able to go back to most of your usual activities in 1 or 2 days. This care sheet gives you a general idea about how long it will take for you to recover. But each person recovers at a different pace. Follow the steps below to get better as quickly as possible. How can you care for yourself at home? Activity · Rest when you feel tired. Getting enough sleep will help you recover. · Try to walk each day. Start by walking a little more than you did the day before. Bit by bit, increase the amount you walk. Walking boosts blood flow and helps prevent pneumonia and constipation. · Avoid strenuous activities, such as bicycle riding, jogging, weight lifting, or aerobic exercise, until your doctor says it is okay. · Ask your doctor when you can drive again. · Most people are able to return to work within 1 or 2 days after the procedure. · You may shower and take baths as usual. 
· Ask your doctor when it is okay for you to have sex. Diet · You can eat your normal diet. If your stomach is upset, try bland, low-fat foods like plain rice, broiled chicken, toast, and yogurt. · Drink plenty of fluids (unless your doctor tells you not to). Medicines · Take pain medicines exactly as directed. ¨ If the doctor gave you a prescription medicine for pain, take it as prescribed. ¨ If you are not taking a prescription pain medicine, ask your doctor if you can take an over-the-counter medicine. · If you think your pain medicine is making you sick to your stomach: 
¨ Take your medicine after meals (unless your doctor has told you not to). ¨ Ask your doctor for a different pain medicine. · If your doctor prescribed antibiotics, take them as directed. Do not stop taking them just because you feel better. You need to take the full course of antibiotics. Follow-up care is a key part of your treatment and safety. Be sure to make and go to all appointments, and call your doctor if you are having problems. It's also a good idea to know your test results and keep a list of the medicines you take. When should you call for help? Call 911 anytime you think you may need emergency care. For example, call if: 
· You passed out (lost consciousness). · You have severe trouble breathing. · You have sudden chest pain and shortness of breath, or you cough up blood. · You have severe belly pain. Call your doctor now or seek immediate medical care if: 
· You are sick to your stomach or cannot keep fluids down. · Your urine is still red or you see blood clots after you have urinated several times. · You have trouble passing urine or stool, especially if you have pain or swelling in your lower belly. · You have signs of a blood clot, such as: 
¨ Pain in your calf, back of the knee, thigh, or groin. ¨ Redness and swelling in your leg or groin. · You develop a fever or severe chills. · You have pain in your back just below your rib cage. This is called flank pain. Watch closely for changes in your health, and be sure to contact your doctor if: 
· You have pain or burning when you urinate. A burning feeling is normal for a day or two after the test, but call if it does not get better. · You have a frequent urge to urinate but can pass only small amounts of urine. · Your urine is pink, red, or cloudy, or smells bad. It is normal for the urine to have a pinkish color for a few days after the test, but call if it does not get better. Where can you learn more? Go to http://elizabeth-drew.info/. Enter B333 in the search box to learn more about \"Cystoscopy: What to Expect at Home. \" Current as of: November 11, 2016 Content Version: 11.3 © 2588-7814 [employer]. Care instructions adapted under license by [employer] (which disclaims liability or warranty for this information). If you have questions about a medical condition or this instruction, always ask your healthcare professional. [address] [address] any warranty or liability for your use of this information. DISCHARGE SUMMARY from Nurse The following personal items are in your possession at time of discharge: 
 
Dental Appliances: Partials, Lowers, Uppers Visual Aid: Glasses Home Medications: None Jewelry: None Clothing: Undergarments, Shirt, Pants, Footwear Other Valuables: None, Eyeglasses PATIENT INSTRUCTIONS: 
 
After general anesthesia or intravenous sedation, for 24 hours or while taking prescription Narcotics: · Limit your activities · Do not drive and operate hazardous machinery · Do not make important personal or business decisions · Do  not drink alcoholic beverages · If you have not urinated within 8 hours after discharge, please contact your surgeon on call. Report the following to your surgeon: 
· Excessive pain, swelling, redness or odor of or around the surgical area · Temperature over 100.5 · Nausea and vomiting lasting longer than 4 hours or if unable to take medications · Any signs of decreased circulation or nerve impairment to extremity: change in color, persistent  numbness, tingling, coldness or increase pain · Any questions What to do at Home: 
Recommended activity: Activity as tolerated and no driving for today These are general instructions for a healthy lifestyle: No smoking/ No tobacco products/ Avoid exposure to second hand smoke Surgeon General's Warning:  Quitting smoking now greatly reduces serious risk to your health. Obesity, smoking, and sedentary lifestyle greatly increases your risk for illness A healthy diet, regular physical exercise & weight monitoring are important for maintaining a healthy lifestyle You may be retaining fluid if you have a history of heart failure or if you experience any of the following symptoms:  Weight gain of 3 pounds or more overnight or 5 pounds in a week, increased swelling in our hands or feet or shortness of breath while lying flat in bed. Please call your doctor as soon as you notice any of these symptoms; do not wait until your next office visit. Recognize signs and symptoms of STROKE: 
 
F-face looks uneven A-arms unable to move or move unevenly S-speech slurred or non-existent T-time-call 911 as soon as signs and symptoms begin-DO NOT go Back to bed or wait to see if you get better-TIME IS BRAIN. Warning Signs of HEART ATTACK Call 911 if you have these symptoms: 
? Chest discomfort. Most heart attacks involve discomfort in the center of the chest that lasts more than a few minutes, or that goes away and comes back. It can feel like uncomfortable pressure, squeezing, fullness, or pain. ? Discomfort in other areas of the upper body. Symptoms can include pain or discomfort in one or both arms, the back, neck, jaw, or stomach. ? Shortness of breath with or without chest discomfort. ? Other signs may include breaking out in a cold sweat, nausea, or lightheadedness. Don't wait more than five minutes to call [address]! Fast action can save your life. Calling 911 is almost always the fastest way to get lifesaving treatment. Emergency Medical Services staff can begin treatment when they arrive  up to an hour sooner than if someone gets to the hospital by car. The discharge information has been reviewed with the patient. The patient verbalized understanding. Discharge medications reviewed with the patient and appropriate educational materials and side effects teaching were provided. Patient armband removed and given to patient to take home. Patient was informed of the privacy risks if armband lost or stolen Discharge Orders None General Information Please provide this summary of care documentation to your next provider. Patient Signature:  ____________________________________________________________ Date:  ____________________________________________________________  
  
Radha Tran Provider Signature:  ____________________________________________________________ Date:  ____________________________________________________________

## 2017-08-28 NOTE — ANESTHESIA PREPROCEDURE EVALUATION
Anesthetic History   No history of anesthetic complications            Review of Systems / Medical History  Patient summary reviewed and pertinent labs reviewed    Pulmonary    COPD: mild               Neuro/Psych         Dementia     Cardiovascular    Hypertension        Dysrhythmias : atrial fibrillation  Pacemaker (checked within the last couple months)    Exercise tolerance: <4 METS  Comments: Last coumadin 2 weeks ago per daughter   GI/Hepatic/Renal     GERD: well controlled    Renal disease: stones       Endo/Other        Morbid obesity and cancer (prostate)     Other Findings   Comments:   Risk Factors for Postoperative nausea/vomiting:       History of postoperative nausea/vomiting? NO       Female? NO       Motion sickness? NO       Intended opioid administration for postoperative analgesia? YES      Smoking Abstinence  Current Smoker? NO  Elective Surgery? YES  Seen preoperatively by anesthesiologist or proxy prior to day of surgery? YES  Pt abstained from smoking 24 hours prior to anesthesia?  N/A           Physical Exam    Airway  Mallampati: III  TM Distance: 4 - 6 cm  Neck ROM: normal range of motion   Mouth opening: Normal     Cardiovascular  Regular rate and rhythm,  S1 and S2 normal,  no murmur, click, rub, or gallop  Rhythm: regular  Rate: normal         Dental    Dentition: Full lower dentures and Full upper dentures     Pulmonary  Breath sounds clear to auscultation               Abdominal  GI exam deferred       Other Findings            Anesthetic Plan    ASA: 4  Anesthesia type: general          Induction: Intravenous  Anesthetic plan and risks discussed with: Patient

## 2017-08-28 NOTE — H&P
Yvan Yu  1940     ASSESSMENT:        Encounter Diagnoses   Name Primary?  Malignant neoplasm of urinary bladder, unspecified site ([facility] Yes    Prostate CA ([facility] [facility]      Kidney stone         1. Bladder cancer diagnosed in 2010, risk factors: history of tobacco, long history of tobacco abuse and hydrocarbon exposure. S/p induction BCG. 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 Maintenance BCG #6/6 (half strength) 1/11/17. Cystoscopy 8/15/17 was normal. Negative urine cytology 2/2017.   2. History of high grade prostate cancer s/p seed implant and XRT several years ago. Current disease status: Stable, most recent PSA <0.03ng/ml on 8/23/16  3. Mild/ moderate LUTS, specifically nocturia. Wears pads at night. Not currently on medication for prostate. PVR 70cc today. 4. Proximal 9 mm left ureteral calculus with hydronephrosis by renal US 8/15/17. Not seen on prior renal US 8/2016.  5. History of CaOx urolithiasis, s/p left ureterorenoscopy with extensive laser ablation of stone 9/2014 for 16 mm UPJ stone. Metabolic work up revealed hypocitraturia (151) and low urine volume. Previously discussed dietary modifications for stone prevention. 6. On anticoagulants.      PLAN:    1. Reviewed renal US with patient. 2. Cystoscopy in office today - see separate office note. 3. Urine sent for cytology and pre-op culture, will treat accordingly. 4. KUB today. 5. CBC, BMP drawn today - pending. 6. Schedule left ureteroscopy with laser lithotripsy, JJ stent.      Patient's BMI is out of the normal parameters. Information about BMI was given to the patient.           Chief Complaint   Patient presents with   [facility] Kidney Stone       Patient is here for cystoscopy.  Bladder Cancer      HISTORY OF PRESENT ILLNESS: Yvan Yu is a 76 y.o. WHITE OR  male who presents today to review renal US and for cystoscopy.  He has bladder cancer, prostate cancer, urolithiasis, renal cysts, and radiation cystitis. He is s/p TURBT on 8/16/10 and completed induction BCG. S/p TURBT on 9/15/16. 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 S/p maintenance BCG #6/6 (half-dose) on 1/11/17. The patient reports episodes of gross hematuria following last BCG. Cystoscopy 2/21/17 was normal. Negative urine cytology 2/2017.     Patient has a history of calcium oxalate urolithiasis and is s/p left ureteroscopy with laser litho in 9/2014 for a 16mm UPJ stone. Renal US today shows 9mm proximal left ureteral stone with hydronephrosis. Denies f/c/n/v, flank or abdminal pain. Metabolic work up revealed hypocitraturia (151) and low urine volume.     He also has a history of high grade prostate cancer. He is s/p seed implant and XRT several years ago. Most recent PSA remains undetectable at <0.03ng/ml on 8/23/16. He is voiding without difficulty. Not currently on medications for his prostate. He reports variable nocturia, can be up to 5x per night. He wears pads at night. Reports good FOS.  He denies any dysuria, gross hematuria, incontinence.          PSA   Latest Ref Rng 0.00 - 4.00 ng/mL   8/23/2016 <0.03   2/23/2016 <0.03   4/8/2015 <0.03   6/10/2014 <0.03   8/14/2013 <0.03   2/27/2013 0.011   6/27/2012 <0.011           Past Medical History:   Diagnosis Date    A-fib [facility])      Bladder cancer ([facility]       TURBT in 2010    CAD (coronary artery disease)      COPD (chronic obstructive pulmonary disease) (HCC)      Coronary artery disease      Dementia      Diabetes ([facility] [address] [facility]      Diarrhea, unspecified      Encounter for postoperative care      Feeling of incomplete bladder emptying      GERD (gastroesophageal reflux disease)      History of kidney stones      History of prostate cancer      Hypercholesterolemia      Hyperlipidemia      Hypertension      Intermittent urinary stream      Kidney stone      Lower urinary tract symptoms (LUTS)      Malignant neoplasm of other specified sites of bladder      Malignant neoplasm of urinary bladder (HCC)      Nocturia      Pacemaker      Personal history of urinary calculi      Prostate cancer ([address]       s/p seed implant and XRT    Senile dementia      Urinary frequency      Urinary urgency              Past Surgical History:   Procedure Laterality Date    CARDIAC SURG PROCEDURE UNLIST   1989     HX. OF BYPASS GRAFT    HX OTHER SURGICAL         BIOPSY PROSTATE    HX PACEMAKER   08/12/2015     Pulse generator replacement. Dr. Zulma Schreiber.  HX PACEMAKER PLACEMENT   09/25/2008    HX UROLOGICAL   09/2016     CRMS. 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 Dr. Bertin Gillespie.  HX UROLOGICAL Left 09/2014     Ureterorenoscopy with extensive laser ablation of stone.            Social History   Substance Use Topics    Smoking status: Former Smoker       Packs/day: 1.00       Years: 45.00       Types: Cigarettes       Quit date: 4/27/1989    Smokeless tobacco: Never Used    Alcohol use No      No Known Allergies           Family History   Problem Relation Age of Onset    Heart Attack Mother      Arthritis-osteo Father               Current Outpatient Prescriptions   Medication Sig Dispense Refill    metoprolol tartrate (LOPRESSOR) 50 mg tablet          omega-3 fatty acids-vitamin e (FISH OIL) 1,000 mg cap Take 1 Cap by mouth two (2) times a day.        gabapentin (NEURONTIN) 300 mg capsule Take 300 mg by mouth two (2) times a day.        metformin (GLUCOPHAGE) 1,000 mg tablet Take 1,000 mg by mouth two (2) times daily (with meals).        pravastatin (PRAVACHOL) 40 mg tablet 40 mg nightly.        amLODIPine (NORVASC) 5 mg tablet Take 5 mg by mouth daily.          furosemide (LASIX) 40 mg tablet Take  by mouth daily.          olmesartan (BENICAR) 40 mg tablet Take  by mouth daily.          aspirin 81 mg tablet Take 81 mg by mouth.        warfarin (COUMADIN) 5 mg tablet Take 6 mg by mouth daily.          Review of Systems  Constitutional: Fever: No  Skin: Rash: No  HEENT: Hearing difficulty: No  Eyes: Blurred vision: No  Cardiovascular: Chest pain: No  Respiratory: Shortness of breath: Yes  Gastrointestinal: Nausea/vomiting: No  Musculoskeletal: Back pain: No  Neurological: Weakness: No  Psychological: Memory loss: No  Comments/additional findings:      PHYSICAL EXAMINATION:  Visit Vitals    Ht 5' 8\" (1.727 m)    Wt 246 lb (111.6 kg)    BMI 37.4 kg/m2     Constitutional: WDWN, Pleasant and appropriate affect, No acute distress. CV:  No peripheral swelling noted, RRR  Respiratory: No respiratory distress or difficulties, CTAB. Abdomen:  No abdominal masses or tenderness. No CVA tenderness. No hernias noted.  Male:    LASHA:Perineum normal to visual inspection, no erythema or irritation, Sphincter with good tone, Rectum with no hemorrhoids, fissures or masses. Prostate is small and fibrotic. SCROTUM:  No scrotal rash or lesions noticed. Normal bilateral testes and epididymis. PENIS: Urethral meatus normal in location and size. No urethral discharge. Skin: No jaundice. Neuro/Psych:  Alert and oriented x 3. Affect appropriate. Lymphatic:   No enlarged inguinal lymph nodes. REVIEW OF LABS AND IMAGING:           Results for orders placed or performed during the hospital encounter of 05/26/17   CBC WITH AUTOMATED DIFF   Result Value Ref Range     WBC 8.4 4.6 - 13.2 K/uL     RBC 4.84 4.70 - 5.50 M/uL     HGB 11.7 (L) 13.0 - 16.0 g/dL     HCT 38.1 36.0 - 48.0 %     MCV 78.7 74.0 - 97.0 FL     MCH 24.2 24.0 - 34.0 PG     MCHC 30.7 (L) 31.0 - 37.0 g/dL     RDW 18.8 (H) 11.6 - 14.5 %     PLATELET 188 016 - 227 K/uL     MPV 9.9 9.2 - 11.8 FL     NEUTROPHILS 55 40 - 73 %     LYMPHOCYTES 32 21 - 52 %     MONOCYTES 10 3 - 10 %     EOSINOPHILS 2 0 - 5 %     BASOPHILS 1 0 - 2 %     ABS. NEUTROPHILS 4.7 1.8 - 8.0 K/UL     ABS. LYMPHOCYTES 2.7 0.9 - 3.6 K/UL     ABS. MONOCYTES 0.8 0.05 - 1.2 K/UL     ABS. EOSINOPHILS 0.1 0.0 - 0.4 K/UL     ABS. BASOPHILS 0.0 0.0 - 0.06 K/UL     DF AUTOMATED      METABOLIC PANEL, BASIC   Result Value Ref Range     Sodium 142 136 - 145 mmol/L     Potassium 3.7 3.5 - 5.5 mmol/L     Chloride 107 100 - 108 mmol/L     CO2 30 21 - 32 mmol/L     Anion gap 5 3.0 - 18 mmol/L     Glucose 120 (H) 74 - 99 mg/dL     BUN 16 7.0 - 18 MG/DL     Creatinine 1.00 0.6 - 1.3 MG/DL     BUN/Creatinine ratio 16 12 - 20       GFR est AA >60 >60 ml/min/1.73m2     GFR est non-AA >60 >60 ml/min/1.73m2     Calcium 8.7 8.5 - 10.1 MG/DL   CARDIAC PANEL,(CK, CKMB & TROPONIN)   Result Value Ref Range     CK 75 39 - 308 U/L     CK - MB 1.6 <3.6 ng/ml     CK-MB Index 2.1 0.0 - 4.0 %     Troponin-I, Qt. <0.02 0.0 - 0.045 NG/ML   PRO-BNP   Result Value Ref Range     NT pro-BNP 1602 0 - 1800 PG/ML   MAGNESIUM   Result Value Ref Range     Magnesium 2.3 1.6 - 2.6 mg/dL   PROTHROMBIN TIME + INR   Result Value Ref Range     Prothrombin time 21.5 (H) 11.5 - 15.2 sec     INR 2.0 (H) 0.8 - 1.2     EKG, 12 LEAD, INITIAL   Result Value Ref Range     Ventricular Rate 63 BPM     Atrial Rate 74 BPM     QRS Duration 178 ms     Q-T Interval 492 ms     QTC Calculation (Bezet) 503 ms     Calculated R Axis -57 degrees     Calculated T Axis 111 degrees     Diagnosis           Ventricular-paced rhythm  When compared with ECG of 23-MAR-2017 09:04,  No significant change was found  Confirmed by Ronni Mora MD, --- ([id number]) on 5/27/2017 10:59:21 AM      SURGICAL PATHOLOGY REPORT 9/15/2016:  FINAL DIAGNOSIS:  A.   TRANSURETHRAL RESECTION OF BLADDER TUMOR, CLINICALLY LEFT HEMITRIGONE TUMOR:         - PAPILLARY UROTHELIAL CARCINOMA, HIGH-GRADE.         - NEGATIVE FOR AN INVASIVE COMPONENT IN THE PLANES OF SECTION.    B.   TRANSURETHRAL RESECTION OF BLADDER TUMOR, CLINICALLY RIGHT BLADDER NECK TUMOR:         - CONSISTENT WITH PAPILLARY UROTHELIAL CARCINOMA, HIGH-GRADE.         - NEGATIVE FOR AN INVASIVE COMPONENT IN THE PLANES OF section.     [facility] 8/17/2015:  Urine oxalate: 13  Urine citrate: 151  Urine pH: 5.510  Uric acid: 2.48        Nabila Graham MD

## 2017-08-28 NOTE — BRIEF OP NOTE
BRIEF OPERATIVE NOTE    Date of Procedure: 8/28/2017   Preoperative Diagnosis: prostate cancer c61  Postoperative Diagnosis: prostate cancer c61    Procedure(s):  CYSTOSCOPY , left retrogrades,left jj stent,laser standby  Surgeon(s) and Role:     * Sergei Sandoval MD - Primary         Assistant Staff:       Surgical Staff:  Circ-1: Rodríguez Martines RN  Radiology Technician: Nell Yates  Scrub Tech-1: Jeffery Go  Event Time In   Incision Start 12:50 PM   Incision Close  1:10 PM     Anesthesia: General   Estimated Blood Loss: minimal  Specimens:   ID Type Source Tests Collected by Time Destination   1 : URINE LEFT KIDNEY Urine Kidney, Left CULTURE, URINE Sergei Sandoval MD 8/28/2017 12:38 PM Microbiology      Findings: Obstructing left ureteral calculus 7 mm X 12 mm, proximal ureter, with concentrated malodorous urine behind the obstructing stone, requiring JJ stent placement and deferring definitive stone therapy as there was a possibility of persistent UTI. Cultures taken, will reschedule when urine is clear. Complications: none  Implants:   Implant Name Type Inv.  Item Serial No.  Lot No. LRB No. Used Jennifer Duffy DBL-PGTL [id number] Sarah Duffy DBL-PGTL [id number] -- [facility] [id number] Left 1 Implanted       Sergei Sandoval MD

## 2017-08-28 NOTE — DISCHARGE INSTRUCTIONS
Cystoscopy: What to Expect at [address]    A cystoscopy is a procedure that lets a doctor look inside of the bladder and the urethra. The urethra is the tube that carries urine from the bladder to outside the body. The doctor uses a thin, lighted tool called a cystoscope. Your bladder is filled with fluid. This stretches the bladder so that your doctor can look closely at the inside of your bladder. After the cystoscopy, your urethra may be sore at first, and it may burn when you urinate for the first few days after the procedure. You may feel the need to urinate more often, and your urine may be pink. These symptoms should get better in 1 or 2 days. You will probably be able to go back to most of your usual activities in 1 or 2 days. This care sheet gives you a general idea about how long it will take for you to recover. But each person recovers at a different pace. Follow the steps below to get better as quickly as possible. How can you care for yourself at home? Activity  · Rest when you feel tired. Getting enough sleep will help you recover. · Try to walk each day. Start by walking a little more than you did the day before. Bit by bit, increase the amount you walk. Walking boosts blood flow and helps prevent pneumonia and constipation. · Avoid strenuous activities, such as bicycle riding, jogging, weight lifting, or aerobic exercise, until your doctor says it is okay. · Ask your doctor when you can drive again. · Most people are able to return to work within 1 or 2 days after the procedure. · You may shower and take baths as usual.  · Ask your doctor when it is okay for you to have sex. Diet  · You can eat your normal diet. If your stomach is upset, try bland, low-fat foods like plain rice, broiled chicken, toast, and yogurt. · Drink plenty of fluids (unless your doctor tells you not to). Medicines  · Take pain medicines exactly as directed.   ¨ If the doctor gave you a prescription medicine for pain, take it as prescribed. ¨ If you are not taking a prescription pain medicine, ask your doctor if you can take an over-the-counter medicine. · If you think your pain medicine is making you sick to your stomach:  ¨ Take your medicine after meals (unless your doctor has told you not to). ¨ Ask your doctor for a different pain medicine. · If your doctor prescribed antibiotics, take them as directed. Do not stop taking them just because you feel better. You need to take the full course of antibiotics. Follow-up care is a key part of your treatment and safety. Be sure to make and go to all appointments, and call your doctor if you are having problems. It's also a good idea to know your test results and keep a list of the medicines you take. When should you call for help? Call 911 anytime you think you may need emergency care. For example, call if:  · You passed out (lost consciousness). · You have severe trouble breathing. · You have sudden chest pain and shortness of breath, or you cough up blood. · You have severe belly pain. Call your doctor now or seek immediate medical care if:  · You are sick to your stomach or cannot keep fluids down. · Your urine is still red or you see blood clots after you have urinated several times. · You have trouble passing urine or stool, especially if you have pain or swelling in your lower belly. · You have signs of a blood clot, such as:  ¨ Pain in your calf, back of the knee, thigh, or groin. ¨ Redness and swelling in your leg or groin. · You develop a fever or severe chills. · You have pain in your back just below your rib cage. This is called flank pain. Watch closely for changes in your health, and be sure to contact your doctor if:  · You have pain or burning when you urinate. A burning feeling is normal for a day or two after the test, but call if it does not get better.   · You have a frequent urge to urinate but can pass only small amounts of urine.  · Your urine is pink, red, or cloudy, or smells bad. It is normal for the urine to have a pinkish color for a few days after the test, but call if it does not get better. Where can you learn more? Go to http://elizabeth-rdew.info/. Enter U167 in the search box to learn more about \"Cystoscopy: What to Expect at Home. \"  Current as of: November 11, 2016  Content Version: 11.3  © 4007-0591 [employer]. Care instructions adapted under license by [employer] (which disclaims liability or warranty for this information). If you have questions about a medical condition or this instruction, always ask your healthcare professional. [place] [address] any warranty or liability for your use of this information. DISCHARGE SUMMARY from Nurse    The following personal items are in your possession at time of discharge:    Dental Appliances: Partials, Lowers, Uppers  Visual Aid: Glasses     Home Medications: None  Jewelry: None  Clothing: Undergarments, Shirt, Pants, Footwear  Other Valuables: None, Eyeglasses             PATIENT INSTRUCTIONS:    After general anesthesia or intravenous sedation, for 24 hours or while taking prescription Narcotics:  · Limit your activities  · Do not drive and operate hazardous machinery  · Do not make important personal or business decisions  · Do  not drink alcoholic beverages  · If you have not urinated within 8 hours after discharge, please contact your surgeon on call.     Report the following to your surgeon:  · Excessive pain, swelling, redness or odor of or around the surgical area  · Temperature over 100.5  · Nausea and vomiting lasting longer than 4 hours or if unable to take medications  · Any signs of decreased circulation or nerve impairment to extremity: change in color, persistent  numbness, tingling, coldness or increase pain  · Any questions        What to do at Home:  Recommended activity: Activity as tolerated and no driving for today  These are general instructions for a healthy lifestyle:    No smoking/ No tobacco products/ Avoid exposure to second hand smoke    Surgeon General's Warning:  Quitting smoking now greatly reduces serious risk to your health. Obesity, smoking, and sedentary lifestyle greatly increases your risk for illness    A healthy diet, regular physical exercise & weight monitoring are important for maintaining a healthy lifestyle    You may be retaining fluid if you have a history of heart failure or if you experience any of the following symptoms:  Weight gain of 3 pounds or more overnight or 5 pounds in a week, increased swelling in our hands or feet or shortness of breath while lying flat in bed. Please call your doctor as soon as you notice any of these symptoms; do not wait until your next office visit. Recognize signs and symptoms of STROKE:    F-face looks uneven    A-arms unable to move or move unevenly    S-speech slurred or non-existent    T-time-call 911 as soon as signs and symptoms begin-DO NOT go       Back to bed or wait to see if you get better-TIME IS BRAIN. Warning Signs of HEART ATTACK     Call 911 if you have these symptoms:   Chest discomfort. Most heart attacks involve discomfort in the center of the chest that lasts more than a few minutes, or that goes away and comes back. It can feel like uncomfortable pressure, squeezing, fullness, or pain.  Discomfort in other areas of the upper body. Symptoms can include pain or discomfort in one or both arms, the back, neck, jaw, or stomach.  Shortness of breath with or without chest discomfort.  Other signs may include breaking out in a cold sweat, nausea, or lightheadedness. Don't wait more than five minutes to call 911 - MINUTES MATTER! Fast action can save your life. Calling 911 is almost always the fastest way to get lifesaving treatment.  Emergency Medical Services staff can begin treatment when they arrive -- up to an hour sooner than if someone gets to the hospital by car. The discharge information has been reviewed with the patient. The patient verbalized understanding. Discharge medications reviewed with the patient and appropriate educational materials and side effects teaching were provided. Patient armband removed and given to patient to take home.   Patient was informed of the privacy risks if armband lost or stolen

## 2017-08-28 NOTE — ANESTHESIA POSTPROCEDURE EVALUATION
Post-Anesthesia Evaluation and Assessment    Patient: Emmy Sheppard MRN: [id number]  SSN: [id number]    YOB: 1940  Age: 76 y.o. Sex: male      Data from PACU flowsheet    Cardiovascular Function/Vital Signs  Visit Vitals    /80    Pulse 67    Temp 36.1 °C (97 °F)    Resp 19    Ht 5' 8\" (1.727 m)    Wt 111.3 kg (245 lb 5 oz)    SpO2 92%    BMI 37.3 kg/m2       Patient is status post general anesthesia for Procedure(s):  CYSTOSCOPY , left retrogrades,left jj stent,laser standby. Nausea/Vomiting: controlled    Postoperative hydration reviewed and adequate. Pain:  Pain Scale 1: Numeric (0 - 10) (08/28/17 1332)  Pain Intensity 1: 0 (08/28/17 1332)   Managed      Mental Status and Level of Consciousness: Alert and oriented     Pulmonary Status:   O2 Device: Nasal cannula (08/28/17 1417)   Adequate oxygenation and airway patent    Complications related to anesthesia: None    Post-anesthesia assessment completed.  No concerns    Signed By: Alethea Aguilar MD     August 28, 2017

## 2017-08-29 NOTE — OP NOTES
Zeeshan Pereira    Name:  Rob Carlson  MR#:  [id number]  :  1940  Account #:  [de-identified]  Date of Adm:  2017  Date of Surgery:  2017      PREOPERATIVE DIAGNOSES  1. History of adenocarcinoma of the prostate, status post interstitial  brachytherapy, with no evidence of disease. 2. History of high-grade bladder cancer, with no evidence of  recurrence. 3. Left ureteral calculus, with obstruction. POSTOPERATIVE DIAGNOSES  1. History of adenocarcinoma of the prostate, status post interstitial  brachytherapy, with no evidence of disease. 2. History of high-grade bladder cancer, with no evidence of  recurrence. 3. Left ureteral calculus, with obstruction. PROCEDURES PERFORMED  1. Cystoscopy. 2. Left retrograde ureteropyelogram.  3. Left Double-J stent placement. 4. Left ureteral catheterization for culture and sensitivity. 5. Laser standby. SURGEON:  Scarlette Sandhoff, M.D. SURGICAL STAFF  Jules Matta R.N.  Radiology Technician, [facility]    ESTIMATED BLOOD LOSS:  Minimal.    SPECIMENS REMOVED:  Left renal urine for culture and sensitivity. ANESTHESIA:  General.    EVENT START TIME:  12:50 p.m. PROCEDURE END TIME:  1:10 p.m. INTRAOPERATIVE FINDINGS:  Obstructing left ureteral calculus, 7 x  12 mm, in the proximal left ureter, with concentrated malodorous urine  behind the obstructing stone requiring a Double-J stent placement and  deferring definitive stone therapy as there was the possibility of a  persistent UTI. The patient had a 50,000 Enterococcus UTI seven  days ago and was treated with amoxicillin, but the urine still showed  small amounts of leukocytes. A culture was taken. We will reschedule  definitive stone therapy when the urine is clear. COMPLICATIONS:  None. IMPLANTS:  A 6-[place]aur[place]ian x 26 cm Double-J ureteral stent in the left  ureter.     INDICATION FOR THE PROCEDURE:  This is a 70-year-old male  with a history of adenocarcinoma of the prostate status post seed  implant over 10 years ago. The patient also has a non-detectable  PSA, but also was found to have some urothelial carcinoma, for which  he has undergone two resections. His last one was high-grade. He  underwent BCG initiation therapy, and this has been the maintenance  therapy. This has been interrupted by the recent episode of hematuria  and the finding of a left proximal ureteral stone, 7 x 12 mm. The  patient now presents for treatment of this today, but he did have the  above urinary tract infection, and his preoperative urinalysis on the day  of the procedure did still show small leukocyte esterase present. The  patient will have urine taken from the left renal pelvis. If this is the  least bit abnormal, then we will just place a Double-J stent and defer  definitive stone surgery in the event of a persistent urinary tract  infection. The patient and family understand this and desire to  proceed. PROCEDURE IN DETAIL:  The patient was identified in the holding  area. He gave informed consent. He was then taken to the  cystoscopy suite and placed on the cystoscopy table in the supine  position. After adequate anesthesia, the patient was placed into the  dorsal lithotomy position. He was appropriately padded, prepped, and  draped in the usual sterile fashion for cystoscopy. A timeout was  taken, with all agreeing on the procedure and laterality. Burn  precautions and beta blocker concerns were addressed. SCDs were  placed for DVT prophylaxis, and appropriate parenteral antibiotic  therapy was administered within one hour of the procedure. The  patient then underwent cystoscopy using the 21-Occitan cystoscope  sheath, and 30- and 70-degree lenses. The urethra was otherwise  normal.  The prostatic fossa was open. The bladder showed no gross  recurrent disease.   The ureteral orifices were normal, except for a  slightly large ureteral orifice on the left side. The patient then  underwent a cannulization of the left ureteral orifice with a 0.038  Glidewire. Over this, a 5-[place] citizen open-ended catheter was placed over  this into the renal pelvis. Once this was placed where I felt the renal  pelvis was, I obtained urine for culture and sensitivity. I then opacified  the ureter and renal pelvis. The finding of a tight obstruction with a  faintly opacified stone of 7 x 12 mm was noted. At this point, the 0.038  Glidewire was replaced through the 5-[place] citizen open-ended catheter,  and the open-ended catheter was removed. I then proceeded with  obtaining urine. When I obtained the urine from the 5-[place] citizen open-  ended catheter, I saw that this was very concentrated and malodorous. I irrigated the renal pelvis with gentamicin solution, and then, I placed a  Double-J stent and deferred definitive stone therapy at this point. Following this, the urine drained well. There was a good curl in the  renal pelvis and a good curl in the bladder on post-placement  fluoroscopy. The patient tolerated the procedure well. He will return  after his urine has completely cleared and recent culture has shown no  Enterococcus.         MD Eliza Wilson / [address]  D:  08/28/2017   22:58  T:  08/29/2017   06:48  Job #:  [id number]

## 2017-09-06 NOTE — PERIOP NOTES
1506  Patient received in PACU and connected to monitors. Vital signs stable. RN at bedside. Will continue to monitor. [address] per CRNA. Pt lungs sound clear, diminished on R side. O2 probe changed out to see if pulse ox improves. Pt is a former smoker and has hx of COPD. Pt repositioned in bed and HOB raised. 1  Dr. Rodríguez Marion in PACU, no orders entered. MD will re-evaluate pt status as to whether or not to admit. [facility] blood sugar = 118, no coverage needed per MAR.    1534  Pt sitting up in stretcher, talking with nurse and taking bites of ice chips. Dr. Rodríguez Marion in PACU to check on pt, will go do rounds and then reassess pt's status. No orders written at this time. 1552  Report and care given to [facility], RN for shift relief. Awaiting Dr. Rodríguez Marion for orders and Op note.

## 2017-09-06 NOTE — H&P
[address] [facility]  1940      ASSESSMENT:           Encounter Diagnoses   Name Primary?  Malignant neoplasm of urinary bladder, unspecified site ([facility] [address] Yes    Prostate CA ([facility] [address]       Kidney stone         1. Bladder cancer diagnosed in 2010, risk factors: history of tobacco, long history of tobacco abuse and hydrocarbon exposure. S/p induction BCG. 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 Maintenance BCG #6/6 (half strength) 1/11/17. Cystoscopy 8/15/17 was normal. Negative urine cytology 2/2017.   2. History of high grade prostate cancer s/p seed implant and XRT several years ago. Current disease status: Stable, most recent PSA <0.03ng/ml on 8/23/16  3. Mild/ moderate LUTS, specifically nocturia. Wears pads at night. Not currently on medication for prostate. PVR 70cc today. 4. Proximal 9 mm left ureteral calculus with hydronephrosis by renal US 8/15/17. Not seen on prior renal US 8/2016.  5. History of CaOx urolithiasis, s/p left ureterorenoscopy with extensive laser ablation of stone 9/2014 for 16 mm UPJ stone. Metabolic work up revealed hypocitraturia (151) and low urine volume. Previously discussed dietary modifications for stone prevention. 6. On anticoagulants.       PLAN:    1. Reviewed renal US with patient. 2. Cystoscopy in office today - see separate office note. 3. Urine sent for cytology and pre-op culture, will treat accordingly. 4. KUB today. 5. CBC, BMP drawn today - pending. 6. Schedule left ureteroscopy with laser lithotripsy, JJ stent.       Patient's BMI is out of the normal parameters.  Information about BMI was given to the patient.              Chief Complaint   Patient presents with   [address] Patient is here for cystoscopy.  Bladder Cancer       HISTORY OF PRESENT ILLNESS: Arash rider 76 y.o. [address] [facility] presents today to review renal US and for cystoscopy.  He has bladder cancer, prostate cancer, urolithiasis, renal cysts, and radiation cystitis. He is s/p TURBT on 8/16/10 and completed induction BCG. S/p TURBT on 9/15/16. 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 S/p maintenance BCG #6/6 (half-dose) on 1/11/17. The patient reports episodes of gross hematuria following last BCG. Cystoscopy 2/21/17 was normal. Negative urine cytology 2/2017.      Patient has a history of calcium oxalate urolithiasis and is s/p left ureteroscopy with laser litho in 9/2014 for a 16mm UPJ stone. Renal US today shows 9mm proximal left ureteral stone with hydronephrosis. Denies f/c/n/v, flank or abdminal pain. Metabolic work up revealed hypocitraturia (151) and low urine volume.      He also has a history of high grade prostate cancer. He is s/p seed implant and XRT several years ago. Most recent PSA remains undetectable at <0.03ng/ml on 8/23/16. He is voiding without difficulty. Not currently on medications for his prostate. He reports variable nocturia, can be up to 5x per night. He wears pads at night. Reports good FOS. He denies any dysuria, gross hematuria, incontinence. Patient is S/P Stent placement on 8/28/2017 as there was purulent debris behind the stone and there was narrowing at the proximal ureter.   He presents today for definitive stone procedure.             PSA   Latest Ref Rng 0.00 - 4.00 ng/mL   8/23/2016 <0.03   2/23/2016 <0.03   4/8/2015 <0.03   6/10/2014 <0.03   8/14/2013 <0.03   2/27/2013 0.011   6/27/2012 <0.011               Past Medical History:   Diagnosis Date    A-fib [facility])       Bladder cancer ([facility] [address]         TURBT in 2010    CAD (coronary artery disease)       COPD (chronic obstructive pulmonary disease) (HCC)       Coronary artery disease       Dementia       Diabetes ([facility] [address]       Diarrhea, unspecified       Encounter for postoperative care       Feeling of incomplete bladder emptying       GERD (gastroesophageal reflux disease)       History of kidney stones       History of prostate cancer       Hypercholesterolemia       Hyperlipidemia       Hypertension       Intermittent urinary stream       Kidney stone       Lower urinary tract symptoms (LUTS)       Malignant neoplasm of other specified sites of bladder       Malignant neoplasm of urinary bladder (HCC)       Nocturia       Pacemaker       Personal history of urinary calculi       Prostate cancer ([address]         s/p seed implant and XRT    Senile dementia       Urinary frequency       Urinary urgency                    Past Surgical History:   Procedure Laterality Date    CARDIAC SURG PROCEDURE UNLIST    1989      HX. OF BYPASS GRAFT    HX OTHER SURGICAL            BIOPSY PROSTATE    HX PACEMAKER    08/12/2015      Pulse generator replacement. Dr. Maki King.  HX PACEMAKER PLACEMENT    09/25/2008    HX UROLOGICAL    09/2016      CRMS. 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 Dr. Shreya Ricks.  HX UROLOGICAL Left 09/2014      Ureterorenoscopy with extensive laser ablation of stone.                 Social History   Substance Use Topics    Smoking status: Former Smoker         Packs/day: 1.00         Years: 45.00         Types: Cigarettes         Quit date: 4/27/1989    Smokeless tobacco: Never Used    Alcohol use No       No Known Allergies                Family History   Problem Relation Age of Onset    Heart Attack Mother Cleshannan Menchaca Arthritis-osteo Father                      Current Outpatient Prescriptions   Medication Sig Dispense Refill    metoprolol tartrate (LOPRESSOR) 50 mg tablet             omega-3 fatty acids-vitamin e (FISH OIL) 1,000 mg cap Take 1 Cap by mouth two (2) times a day.          gabapentin (NEURONTIN) 300 mg capsule Take 300 mg by mouth two (2) times a day.          metformin (GLUCOPHAGE) 1,000 mg tablet Take 1,000 mg by mouth two (2) times daily (with meals).           pravastatin (PRAVACHOL) 40 mg tablet 40 mg nightly.          amLODIPine (NORVASC) 5 mg tablet Take 5 mg by mouth daily.            furosemide (LASIX) 40 mg tablet Take  by mouth daily.            olmesartan (BENICAR) 40 mg tablet Take  by mouth daily.            aspirin 81 mg tablet Take 81 mg by mouth.            warfarin (COUMADIN) 5 mg tablet Take 6 mg by mouth daily.             Review of Systems  Constitutional: Fever: No  Skin: Rash: No  HEENT: Hearing difficulty: No  Eyes: Blurred vision: No  Cardiovascular: Chest pain: No  Respiratory: Shortness of breath: Yes  Gastrointestinal: Nausea/vomiting: No  Musculoskeletal: Back pain: No  Neurological: Weakness: No  Psychological: Memory loss: No  Comments/additional findings:       PHYSICAL EXAMINATION:       Visit Vitals    Ht 5' 8\" (1.727 m)    Wt 246 lb (111.6 kg)    BMI 37.4 kg/m2      Constitutional: WDWN, Pleasant and appropriate affect, No acute distress. CV:  No peripheral swelling noted, RRR  Respiratory: No respiratory distress or difficulties, CTAB. Abdomen:  No abdominal masses or tenderness. No CVA tenderness. No hernias noted.  Male:    LASHA:Perineum normal to visual inspection, no erythema or irritation, Sphincter with good tone, Rectum with no hemorrhoids, fissures or masses. Prostate is small and fibrotic. SCROTUM:  No scrotal rash or lesions noticed. Normal bilateral testes and epididymis. PENIS: Urethral meatus normal in location and size. No urethral discharge. Skin: No jaundice. Neuro/Psych:  Alert and oriented x 3. Affect appropriate. Lymphatic:   No enlarged inguinal lymph nodes.     REVIEW OF LABS AND IMAGING:                Results for orders placed or performed during the hospital encounter of 05/26/17   CBC WITH AUTOMATED DIFF   Result Value Ref Range      WBC 8.4 4.6 - 13.2 K/uL      RBC 4.84 4.70 - 5.50 M/uL      HGB 11.7 (L) 13.0 - 16.0 g/dL      HCT 38.1 36.0 - 48.0 %      MCV 78.7 74.0 - 97.0 FL      MCH 24.2 24.0 - 34.0 PG      MCHC 30.7 (L) 31.0 - 37.0 g/dL      RDW 18.8 (H) 11.6 - 14.5 %      PLATELET 446 342 - 917 K/uL      MPV 9.9 9.2 - 11.8 FL      NEUTROPHILS 55 40 - 73 %      LYMPHOCYTES 32 21 - 52 %      MONOCYTES 10 3 - 10 %      EOSINOPHILS 2 0 - 5 %      BASOPHILS 1 0 - 2 %      ABS. NEUTROPHILS 4.7 1.8 - 8.0 K/UL      ABS. LYMPHOCYTES 2.7 0.9 - 3.6 K/UL      ABS. MONOCYTES 0.8 0.05 - 1.2 K/UL      ABS. EOSINOPHILS 0.1 0.0 - 0.4 K/UL      ABS.  BASOPHILS 0.0 0.0 - 0.06 K/UL      DF AUTOMATED      METABOLIC PANEL, BASIC   Result Value Ref Range      Sodium 142 136 - 145 mmol/L      Potassium 3.7 3.5 - 5.5 mmol/L      Chloride 107 100 - 108 mmol/L      CO2 30 21 - 32 mmol/L      Anion gap 5 3.0 - 18 mmol/L      Glucose 120 (H) 74 - 99 mg/dL      BUN 16 7.0 - 18 MG/DL      Creatinine 1.00 0.6 - 1.3 MG/DL      BUN/Creatinine ratio 16 12 - 20        GFR est AA >60 >60 ml/min/1.73m2      GFR est non-AA >60 >60 ml/min/1.73m2      Calcium 8.7 8.5 - 10.1 MG/DL   CARDIAC PANEL,(CK, CKMB & TROPONIN)   Result Value Ref Range      CK 75 39 - 308 U/L      CK - MB 1.6 <3.6 ng/ml      CK-MB Index 2.1 0.0 - 4.0 %      Troponin-I, Qt. <0.02 0.0 - 0.045 NG/ML   PRO-BNP   Result Value Ref Range      NT pro-BNP 1602 0 - 1800 PG/ML   MAGNESIUM   Result Value Ref Range      Magnesium 2.3 1.6 - 2.6 mg/dL   PROTHROMBIN TIME + INR   Result Value Ref Range      Prothrombin time 21.5 (H) 11.5 - 15.2 sec      INR 2.0 (H) 0.8 - 1.2     EKG, 12 LEAD, INITIAL   Result Value Ref Range      Ventricular Rate 63 BPM      Atrial Rate 74 BPM      QRS Duration 178 ms      Q-T Interval 492 ms      QTC Calculation (Bezet) 503 ms      Calculated R Axis -57 degrees      Calculated T Axis 111 degrees      Diagnosis               Ventricular-paced rhythm  When compared with ECG of 23-MAR-2017 09:04,  No significant change was found  Confirmed by Lenin Morfin MD, --- ([id number]) on 5/27/2017 10:59:21 AM       SURGICAL PATHOLOGY REPORT 9/15/2016:  FINAL DIAGNOSIS:  A.   TRANSURETHRAL RESECTION OF BLADDER TUMOR, CLINICALLY LEFT HEMITRIGONE TUMOR:         - PAPILLARY UROTHELIAL CARCINOMA, HIGH-GRADE.         - NEGATIVE FOR AN INVASIVE COMPONENT IN THE PLANES OF SECTION.    BRichard Holcomb RESECTION OF BLADDER TUMOR, CLINICALLY RIGHT BLADDER NECK TUMOR:         - CONSISTENT WITH PAPILLARY UROTHELIAL CARCINOMA, HIGH-GRADE.         - NEGATIVE FOR AN INVASIVE COMPONENT IN THE PLANES OF section.      [employer] 8/17/2015:  Urine oxalate: 13  Urine citrate: 151  Urine pH: 5.510  Uric acid: 2.48          Yovani Sanchez MD

## 2017-09-06 NOTE — DISCHARGE INSTRUCTIONS
Cystoscopy: What to Expect at [address] [facility]    A cystoscopy is a procedure that lets a doctor look inside of the bladder and the urethra. The urethra is the tube that carries urine from the bladder to outside the body. The doctor uses a thin, lighted tool called a cystoscope. Your bladder is filled with fluid. This stretches the bladder so that your doctor can look closely at the inside of your bladder. After the cystoscopy, your urethra may be sore at first, and it may burn when you urinate for the first few days after the procedure. You may feel the need to urinate more often, and your urine may be pink. These symptoms should get better in 1 or 2 days. You will probably be able to go back to most of your usual activities in 1 or 2 days. This care sheet gives you a general idea about how long it will take for you to recover. But each person recovers at a different pace. Follow the steps below to get better as quickly as possible. How can you care for yourself at home? Activity  · Rest when you feel tired. Getting enough sleep will help you recover. · Try to walk each day. Start by walking a little more than you did the day before. Bit by bit, increase the amount you walk. Walking boosts blood flow and helps prevent pneumonia and constipation. · Avoid strenuous activities, such as bicycle riding, jogging, weight lifting, or aerobic exercise, until your doctor says it is okay. · Ask your doctor when you can drive again. · Most people are able to return to work within 1 or 2 days after the procedure. · You may shower and take baths as usual.  · Ask your doctor when it is okay for you to have sex. Diet  · You can eat your normal diet. If your stomach is upset, try bland, low-fat foods like plain rice, broiled chicken, toast, and yogurt. · Drink plenty of fluids (unless your doctor tells you not to). Medicines  · Take pain medicines exactly as directed.   ¨ If the doctor gave you a prescription medicine for pain, take it as prescribed. ¨ If you are not taking a prescription pain medicine, ask your doctor if you can take an over-the-counter medicine. · If you think your pain medicine is making you sick to your stomach:  ¨ Take your medicine after meals (unless your doctor has told you not to). ¨ Ask your doctor for a different pain medicine. · If your doctor prescribed antibiotics, take them as directed. Do not stop taking them just because you feel better. You need to take the full course of antibiotics. Follow-up care is a key part of your treatment and safety. Be sure to make and go to all appointments, and call your doctor if you are having problems. It's also a good idea to know your test results and keep a list of the medicines you take. When should you call for help? Call 911 anytime you think you may need emergency care. For example, call if:  · You passed out (lost consciousness). · You have severe trouble breathing. · You have sudden chest pain and shortness of breath, or you cough up blood. · You have severe belly pain. Call your doctor now or seek immediate medical care if:  · You are sick to your stomach or cannot keep fluids down. · Your urine is still red or you see blood clots after you have urinated several times. · You have trouble passing urine or stool, especially if you have pain or swelling in your lower belly. · You have signs of a blood clot, such as:  ¨ Pain in your calf, back of the knee, thigh, or groin. ¨ Redness and swelling in your leg or groin. · You develop a fever or severe chills. · You have pain in your back just below your rib cage. This is called flank pain. Watch closely for changes in your health, and be sure to contact your doctor if:  · You have pain or burning when you urinate. A burning feeling is normal for a day or two after the test, but call if it does not get better.   · You have a frequent urge to urinate but can pass only small amounts of urine.  · Your urine is pink, red, or cloudy, or smells bad. It is normal for the urine to have a pinkish color for a few days after the test, but call if it does not get better. Where can you learn more? Go to http://elizabeth-drew.info/. Enter J967 in the search box to learn more about \"Cystoscopy: What to Expect at Home. \"  Current as of: November 11, 2016  Content Version: 11.3  © 7292-5184 [employer]. Care instructions adapted under license by [employer] (which disclaims liability or warranty for this information). If you have questions about a medical condition or this instruction, always ask your healthcare professional. [place] [address] any warranty or liability for your use of this information. DISCHARGE SUMMARY from Nurse    The following personal items are in your possession at time of discharge:    Dental Appliances: (P) Lowers, Uppers, With patient  Visual Aid: Glasses     Home Medications: None  Jewelry: None  Clothing: Shirt, Socks, Sweater, Undergarments, Footwear, Pants  Other Valuables: Eyeglasses, Other (comment) (baseball hat)             PATIENT INSTRUCTIONS:    After general anesthesia or intravenous sedation, for 24 hours or while taking prescription Narcotics:  · Limit your activities  · Do not drive and operate hazardous machinery  · Do not make important personal or business decisions  · Do  not drink alcoholic beverages  · If you have not urinated within 8 hours after discharge, please contact your surgeon on call.     Report the following to your surgeon:  · Excessive pain, swelling, redness or odor of or around the surgical area  · Temperature over 100.5  · Nausea and vomiting lasting longer than 4 hours or if unable to take medications  · Any signs of decreased circulation or nerve impairment to extremity: change in color, persistent  numbness, tingling, coldness or increase pain  · Any questions        What to do at Home:  These are general instructions for a healthy lifestyle:    No smoking/ No tobacco products/ Avoid exposure to second hand smoke    Surgeon General's Warning:  Quitting smoking now greatly reduces serious risk to your health. Obesity, smoking, and sedentary lifestyle greatly increases your risk for illness    A healthy diet, regular physical exercise & weight monitoring are important for maintaining a healthy lifestyle    You may be retaining fluid if you have a history of heart failure or if you experience any of the following symptoms:  Weight gain of 3 pounds or more overnight or 5 pounds in a week, increased swelling in our hands or feet or shortness of breath while lying flat in bed. Please call your doctor as soon as you notice any of these symptoms; do not wait until your next office visit. Recognize signs and symptoms of STROKE:    F-face looks uneven    A-arms unable to move or move unevenly    S-speech slurred or non-existent    T-time-call 911 as soon as signs and symptoms begin-DO NOT go       Back to bed or wait to see if you get better-TIME IS BRAIN. Warning Signs of HEART ATTACK     Call 911 if you have these symptoms:   Chest discomfort. Most heart attacks involve discomfort in the center of the chest that lasts more than a few minutes, or that goes away and comes back. It can feel like uncomfortable pressure, squeezing, fullness, or pain.  Discomfort in other areas of the upper body. Symptoms can include pain or discomfort in one or both arms, the back, neck, jaw, or stomach.  Shortness of breath with or without chest discomfort.  Other signs may include breaking out in a cold sweat, nausea, or lightheadedness. Don't wait more than five minutes to call 911 - MINUTES MATTER! Fast action can save your life. Calling 911 is almost always the fastest way to get lifesaving treatment.  Emergency Medical Services staff can begin treatment when they arrive -- up to an hour sooner than if someone gets to the hospital by car. The discharge information has been reviewed with the patient. The patient verbalized understanding. Discharge medications reviewed with the patient and appropriate educational materials and side effects teaching were provided. Patient armband removed and given to patient to take home.   Patient was informed of the privacy risks if armband lost or stolen

## 2017-09-06 NOTE — IP AVS SNAPSHOT
Gini Kathia 
 
 
 [address] 
[phone] Patient: Polo Yost MRN: [id number] IRD:5/55[birth year]35 You are allergic to the following No active allergies Recent Documentation Height Weight BMI Smoking Status 1.676 m 113.4 kg 40.35 kg/m2 Former Smoker Emergency Contacts Name Discharge Info Relation Home Work Mobile [address] CAREGIVER [3] Daughter [21] [phone] Redd Wilder  Child [2] [phone] About your hospitalization You were admitted on:  September 6, 2017 You last received care in the:  [facility] PHASE 2 RECOVERY You were discharged on:  September 6, 2017 Unit phone number:  [phone] Why you were hospitalized Your primary diagnosis was:  Not on File Your diagnoses also included:  Left Ureteral Calculus Providers Seen During Your Hospitalizations Provider Role Specialty Primary office phone Hans Woodruff MD Attending Provider Urology [phone] Your Primary Care Physician (PCP) Primary Care Physician Office Phone Office Fax Serenity Dinero [phone] Follow-up Information Follow up With Details Comments Contact Info Lamar Tan MD   [address] [place] [address] [phone] Current Discharge Medication List  
  
CONTINUE these medications which have CHANGED Dose & Instructions Dispensing Information Comments Morning Noon Evening Bedtime * amoxicillin 500 mg capsule Commonly known as:  AMOXIL What changed:  Another medication with the same name was added. Make sure you understand how and when to take each. Your last dose was: Your next dose is:    
   
   
 Dose:  500 mg Take 1 Cap by mouth three (3) times daily. Quantity:  30 Cap Refills:  0 * amoxicillin 500 mg capsule Commonly known as:  AMOXIL What changed: You were already taking a medication with the same name, and this prescription was added. Make sure you understand how and when to take each. Your last dose was: Your next dose is:    
   
   
 Dose:  500 mg Take 1 Cap by mouth three (3) times daily for 5 days. Quantity:  15 Cap Refills:  0  
     
   
   
   
  
 * Notice: This list has 2 medication(s) that are the same as other medications prescribed for you. Read the directions carefully, and ask your doctor or other care provider to review them with you. CONTINUE these medications which have NOT CHANGED Dose & Instructions Dispensing Information Comments Morning Noon Evening Bedtime  
 aspirin 81 mg tablet Your last dose was: Your next dose is:    
   
   
 Dose:  81 mg Take 81 mg by mouth. Refills:  0 BENICAR 40 mg tablet Generic drug:  olmesartan Your last dose was: Your next dose is: Take  by mouth daily. Refills:  0  
     
   
   
   
  
 COUMADIN 5 mg tablet Generic drug:  warfarin Your last dose was: Your next dose is:    
   
   
 Dose:  6 mg Take 6 mg by mouth daily. Refills:  0  
     
   
   
   
  
 FISH OIL 1,000 mg Cap Generic drug:  omega-3 fatty acids-vitamin e Your last dose was: Your next dose is:    
   
   
 Dose:  1 Cap Take 1 Cap by mouth two (2) times a day. Refills:  0  
     
   
   
   
  
 furosemide 40 mg tablet Commonly known as:  LASIX Your last dose was: Your next dose is: Take  by mouth daily. Refills:  0  
     
   
   
   
  
 metFORMIN 1,000 mg tablet Commonly known as:  GLUCOPHAGE Your last dose was: Your next dose is:    
   
   
 Dose:  1000 mg Take 1,000 mg by mouth two (2) times daily (with meals). Refills:  0 metoprolol tartrate 50 mg tablet Commonly known as:  LOPRESSOR Your last dose was: Your next dose is:    
   
   
  Refills:  0 NEURONTIN 300 mg capsule Generic drug:  gabapentin Your last dose was: Your next dose is:    
   
   
 Dose:  300 mg Take 300 mg by mouth two (2) times a day. Refills:  0 NORVASC 5 mg tablet Generic drug:  amLODIPine Your last dose was: Your next dose is:    
   
   
 Dose:  5 mg Take 5 mg by mouth daily. Refills:  0  
     
   
   
   
  
 potassium chloride 20 mEq tablet Commonly known as:  K-DUR, KLOR-CON Your last dose was: Your next dose is:    
   
   
 Dose:  1 Tab Take 1 Tab by mouth daily. Refills:  0  
     
   
   
   
  
 pravastatin 40 mg tablet Commonly known as:  PRAVACHOL Your last dose was: Your next dose is:    
   
   
 Dose:  40 mg  
40 mg nightly. Refills:  0 Where to Get Your Medications Information on where to get these meds will be given to you by the nurse or doctor. ! Ask your nurse or doctor about these medications  
  amoxicillin 500 mg capsule Discharge Instructions Cystoscopy: What to Expect at [facility] Your Recovery A cystoscopy is a procedure that lets a doctor look inside of the bladder and the urethra. The urethra is the tube that carries urine from the bladder to outside the body. The doctor uses a thin, lighted tool called a cystoscope. Your bladder is filled with fluid. This stretches the bladder so that your doctor can look closely at the inside of your bladder. After the cystoscopy, your urethra may be sore at first, and it may burn when you urinate for the first few days after the procedure. You may feel the need to urinate more often, and your urine may be pink.  These symptoms should get better in 1 or 2 days. You will probably be able to go back to most of your usual activities in 1 or 2 days. This care sheet gives you a general idea about how long it will take for you to recover. But each person recovers at a different pace. Follow the steps below to get better as quickly as possible. How can you care for yourself at home? Activity · Rest when you feel tired. Getting enough sleep will help you recover. · Try to walk each day. Start by walking a little more than you did the day before. Bit by bit, increase the amount you walk. Walking boosts blood flow and helps prevent pneumonia and constipation. · Avoid strenuous activities, such as bicycle riding, jogging, weight lifting, or aerobic exercise, until your doctor says it is okay. · Ask your doctor when you can drive again. · Most people are able to return to work within 1 or 2 days after the procedure. · You may shower and take baths as usual. 
· Ask your doctor when it is okay for you to have sex. Diet · You can eat your normal diet. If your stomach is upset, try bland, low-fat foods like plain rice, broiled chicken, toast, and yogurt. · Drink plenty of fluids (unless your doctor tells you not to). Medicines · Take pain medicines exactly as directed. ¨ If the doctor gave you a prescription medicine for pain, take it as prescribed. ¨ If you are not taking a prescription pain medicine, ask your doctor if you can take an over-the-counter medicine. · If you think your pain medicine is making you sick to your stomach: 
¨ Take your medicine after meals (unless your doctor has told you not to). ¨ Ask your doctor for a different pain medicine. · If your doctor prescribed antibiotics, take them as directed. Do not stop taking them just because you feel better. You need to take the full course of antibiotics. Follow-up care is a key part of your treatment and safety.  Be sure to make and go to all appointments, and call your doctor if you are having problems. It's also a good idea to know your test results and keep a list of the medicines you take. When should you call for help? Call 911 anytime you think you may need emergency care. For example, call if: 
· You passed out (lost consciousness). · You have severe trouble breathing. · You have sudden chest pain and shortness of breath, or you cough up blood. · You have severe belly pain. Call your doctor now or seek immediate medical care if: 
· You are sick to your stomach or cannot keep fluids down. · Your urine is still red or you see blood clots after you have urinated several times. · You have trouble passing urine or stool, especially if you have pain or swelling in your lower belly. · You have signs of a blood clot, such as: 
¨ Pain in your calf, back of the knee, thigh, or groin. ¨ Redness and swelling in your leg or groin. · You develop a fever or severe chills. · You have pain in your back just below your rib cage. This is called flank pain. Watch closely for changes in your health, and be sure to contact your doctor if: 
· You have pain or burning when you urinate. A burning feeling is normal for a day or two after the test, but call if it does not get better. · You have a frequent urge to urinate but can pass only small amounts of urine. · Your urine is pink, red, or cloudy, or smells bad. It is normal for the urine to have a pinkish color for a few days after the test, but call if it does not get better. Where can you learn more? Go to http://elizabeth-drew.info/. Enter H003 in the search box to learn more about \"Cystoscopy: What to Expect at Home. \" Current as of: November 11, 2016 Content Version: 11.3 © 4862-6944 [employer].  Care instructions adapted under license by [employer] (which disclaims liability or warranty for this information). If you have questions about a medical condition or this instruction, always ask your healthcare professional. [address] any warranty or liability for your use of this information. DISCHARGE SUMMARY from Nurse The following personal items are in your possession at time of discharge: 
 
Dental Appliances: (P) Lowers, Uppers, With patient Visual Aid: Glasses Home Medications: None Jewelry: None Clothing: Shirt, Socks, Sweater, Undergarments, Footwear, Pants Other Valuables: Eyeglasses, Other (comment) (baseball hat) PATIENT INSTRUCTIONS: 
 
After general anesthesia or intravenous sedation, for 24 hours or while taking prescription Narcotics: · Limit your activities · Do not drive and operate hazardous machinery · Do not make important personal or business decisions · Do  not drink alcoholic beverages · If you have not urinated within 8 hours after discharge, please contact your surgeon on call. Report the following to your surgeon: 
· Excessive pain, swelling, redness or odor of or around the surgical area · Temperature over 100.5 · Nausea and vomiting lasting longer than 4 hours or if unable to take medications · Any signs of decreased circulation or nerve impairment to extremity: change in color, persistent  numbness, tingling, coldness or increase pain · Any questions What to do at Home: These are general instructions for a healthy lifestyle: No smoking/ No tobacco products/ Avoid exposure to second hand smoke Surgeon General's Warning:  Quitting smoking now greatly reduces serious risk to your health. Obesity, smoking, and sedentary lifestyle greatly increases your risk for illness A healthy diet, regular physical exercise & weight monitoring are important for maintaining a healthy lifestyle You may be retaining fluid if you have a history of heart failure or if you experience any of the following symptoms:  Weight gain of 3 pounds or more overnight or 5 pounds in a week, increased swelling in our hands or feet or shortness of breath while lying flat in bed. Please call your doctor as soon as you notice any of these symptoms; do not wait until your next office visit. Recognize signs and symptoms of STROKE: 
 
F-face looks uneven A-arms unable to move or move unevenly S-speech slurred or non-existent T-time-call 911 as soon as signs and symptoms begin-DO NOT go Back to bed or wait to see if you get better-TIME IS BRAIN. Warning Signs of HEART ATTACK Call 911 if you have these symptoms: 
? Chest discomfort. Most heart attacks involve discomfort in the center of the chest that lasts more than a few minutes, or that goes away and comes back. It can feel like uncomfortable pressure, squeezing, fullness, or pain. ? Discomfort in other areas of the upper body. Symptoms can include pain or discomfort in one or both arms, the back, neck, jaw, or stomach. ? Shortness of breath with or without chest discomfort. ? Other signs may include breaking out in a cold sweat, nausea, or lightheadedness. Don't wait more than five minutes to call [address]! Fast action can save your life. Calling 911 is almost always the fastest way to get lifesaving treatment. Emergency Medical Services staff can begin treatment when they arrive  up to an hour sooner than if someone gets to the hospital by car. The discharge information has been reviewed with the patient. The patient verbalized understanding. Discharge medications reviewed with the patient and appropriate educational materials and side effects teaching were provided. Patient armband removed and given to patient to take home. Patient was informed of the privacy risks if armband lost or stolen Discharge Orders None General Information Please provide this summary of care documentation to your next provider. Patient Signature:  ____________________________________________________________ Date:  ____________________________________________________________  
  
Hailee Pendleton Provider Signature:  ____________________________________________________________ Date:  ____________________________________________________________

## 2017-09-06 NOTE — BRIEF OP NOTE
BRIEF OPERATIVE NOTE    Date of Procedure: 9/6/2017   Preoperative Diagnosis: prostate cancer  c61  Postoperative Diagnosis: prostate cancer  c61    Procedure(s):  Cystoscopy, left  URETEROSCOPY,  laser lithotripsy,left stent change  Surgeon(s) and Role:     * Jackson Castro MD - Primary         Surgical Staff:  Circ-1: Jenae Wood RN  Radiology Technician: Clotilde Muro  Event Time In   Incision Start  2:03 PM   Incision Close  2:43 PM     Anesthesia: General   Estimated Blood Loss: minimal  Specimens: none  Findings: large impacted left proximal ureteral calculus fragmented completely and kidney explored for significant fragments and then ablated into smaller pieces. No significant sized stones remained. Much of the stone dust has already passed into bladder. Complications: none  Implants:   Implant Name Type Inv.  Item Serial No.  Lot No. LRB No. Used Jennifer   Marden Clore [id number]-PGTL [id number] Atanykelsey Eugene   Marden Clore [id number]-PGTL [id number] -- Brayden Torres [facility] [id number] Left 1 Implanted       Jackson Castro MD

## 2017-09-06 NOTE — ANESTHESIA PREPROCEDURE EVALUATION
Anesthetic History   No history of anesthetic complications            Review of Systems / Medical History  Patient summary reviewed and pertinent labs reviewed    Pulmonary    COPD: mild               Neuro/Psych         Dementia     Cardiovascular    Hypertension: poorly controlled        Dysrhythmias : atrial fibrillation  Pacemaker    Exercise tolerance: >4 METS  Comments: Last coumadin 7 days ago   GI/Hepatic/Renal         Renal disease: stones       Endo/Other        Morbid obesity and cancer (prostate)     Other Findings   Comments:   Risk Factors for Postoperative nausea/vomiting:       History of postoperative nausea/vomiting? NO       Female? NO       Motion sickness? NO       Intended opioid administration for postoperative analgesia? YES      Smoking Abstinence  Current Smoker? NO  Elective Surgery? YES  Seen preoperatively by anesthesiologist or proxy prior to day of surgery? YES  Pt abstained from smoking 24 hours prior to anesthesia?  N/A         Physical Exam    Airway  Mallampati: III  TM Distance: 4 - 6 cm  Neck ROM: normal range of motion   Mouth opening: Normal     Cardiovascular  Regular rate and rhythm,  S1 and S2 normal,  no murmur, click, rub, or gallop  Rhythm: regular  Rate: normal         Dental    Dentition: Edentulous     Pulmonary  Breath sounds clear to auscultation               Abdominal  GI exam deferred       Other Findings            Anesthetic Plan    ASA: 4  Anesthesia type: general          Induction: Intravenous  Anesthetic plan and risks discussed with: Patient

## 2017-09-07 NOTE — OP NOTES
Zeeshan Pereira    Name:  Raul Wood  MR#:  [id number]  :  1940  Account #:  [de-identified]  Date of Adm:  2017  Date of Surgery:  2017      PREOPERATIVE DIAGNOSES  1. Adenocarcinoma of the prostate. 2. Bladder cancer. 3. History of recurrent stone disease. POSTOPERATIVE DIAGNOSIS: History of recurrent stone disease  with active 16-mm proximal ureteral calculus status post stent  placement 1 week ago. PROCEDURES PERFORMED: Cystoscopy, left ureteroscopy with  laser lithotripsy of stone, disimpaction of impacted left ureteral stone,  left Double-J stent placement. SURGEON: Linda Magana MD    SURGICAL STAFF: William Land RN, circulator. RADIOLOGY TECHNICIAN: Barbara Gordon. PROCEDURE START TIME: 2:03 p.m. PROCEDURE END TIME: 2:43 p.m. ANESTHESIA: General.    ESTIMATED BLOOD LOSS: Minimal.    SPECIMENS REMOVED: none    INTRAOPERATIVE FINDINGS: Large impacted left ureteral calculus,  fragmented completely. The laser fiber was used to pick out the stone  fragments that were impacted into the ureteral wall. The kidney was  explored for significant fragments and any sizable stones were ablated  into smaller pieces. No significant size stone remained at the end of  the procedure. Much of the stone dust had already passed into the  bladder. COMPLICATIONS: None. IMPLANTS: A 6-Ukrainian x 26 cm Double-J ureteral stent, left ureter. INDICATIONS: This is a 75-year-old male who has suffered with  prostate cancer status post brachytherapy for this, he had good  results. 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 The patient did  require resection of this and BCG induction therapy. He has had some  illnesses, so we have not been able to proceed with maintenance  therapy at this time.  The patient has had some hematuria and  cystoscopy was negative, but renal ultrasound showed initially left  hydronephrosis with a left UPJ calculus or left proximal ureteral  calculus. CT was more specific and revealed a 16 x 9 mm obstructing  left ureteral calculus. Last week I attempted to traverse up into and  perform definitive therapy for the stone, but he had a tight narrowed  segment at the area of the large stone and also had some purulent  debris coming out the urine upon stent placement, I deferred definitive  therapy at this time. He now presents for definitive stone surgery  today. DESCRIPTION OF PROCEDURE: The patient was identified in the  holding area, given informed consent, was then taken to the  cystoscopy suite, placed on the cystoscopy table in supine position. After adequate anesthesia, the patient was placed in dorsal lithotomy  position, appropriately padded, prepped and draped in the usual sterile  fashion for cystoscopy. A time-out was taken, all in agreement with  procedure, laterality, burn precautions, beta blocker concerns were  addressed, SCDs were placed for DVT prophylaxis and appropriate  parenteral prophylactic antibiotics were administered. The patient then underwent cystoscopy using the 21-Namibian  cystoscope sheath, 30 and 70 lenses. The above findings were noted. The prostate was open, urethra was normal. The bladder, again no  gross lesions noted. Left Double-J stent was in place, this was  removed up to the urethral meatus. An 0.038 Glidewire was placed up  the ureter beyond the stone into the renal collecting system. I then  removed the old Double-J stent. Using a dual-lumen catheter, I  performed retrograde ureteral pyelogram, which showed the mid and  distal portions of the proximal ureter to be free of stones. I then placed  a second Glidewire up the second port of the dual-lumen catheter. This  was a second Sensor wire actually, and this 0.038 Sensor wire was  passed up into the renal collecting system beyond the stone.  I then  used the Sensor wire as a working wire and left Acompli as a  safety wire. At this point, I then placed the [facility] flexible  ureteroscope over the Sensor wire and then traversed up the ureter to  the level of the stone. The stone was impacted and really buried in the  lateral wall of the ureter. At this point, I initiated fragmentation of the  central portion of the stone and once this was completed with  increased joules because of stone density, I then was able to pick out  the larger fragments out of the ureteral wall laterally on the left ureter  and I then proceeded with further lasering of these larger stone  fragments. The stones did travel into the bladder and I was able to  fragment these up more completely, was fragmented in the kidney. Once I was in the kidney and fragmentation was completed, and all fragments   were felt to be quite small. I saw no other  significant stones in the kidney on panendoscopy and once all  fragments were completely ablated, I then removed the  ureteroscope in total and saw no ureteral injury. The area of impaction  was slightly dusky, but viable. No violation of the ureter was noted. The  procedure was terminated after removal of the scope and with the  preexisting Glidewire in place, I placed a 6-Spanish x 26 cm Double-J  ureteral stent with a good curl in the bladder, good curl in the renal  pelvis upon deployment under cystoscopic and fluoroscopic control. The procedure was terminated. The patient tolerated the procedure  well and was taken to the recovery room in stable condition where he  was later discharged.         MD Ashley Tarango MP  D:  09/06/2017   22:57  T:  09/07/2017   00:31  Job #:  [id number]

## 2022-03-23 NOTE — PERIOP NOTES
Patient arrived to PACU via stretcher, attached to monitor, VS stable, orders acknowledged, will continue to monitor.
Admission

## (undated) DEVICE — SOLUTION IRRIG 3000ML 0.9% SOD CHL FLX CONT 0797208] ICU MEDICAL INC]

## (undated) DEVICE — OPEN-END FLEXI-TIP URETERAL CATHETER: Brand: FLEXI-TIP

## (undated) DEVICE — KENDALL SCD EXPRESS SLEEVES, KNEE LENGTH, MEDIUM: Brand: KENDALL SCD

## (undated) DEVICE — CONTAINER DRN 20L DISP FLUIDRN LLS

## (undated) DEVICE — Device

## (undated) DEVICE — SYRINGE MED 20ML STD CLR PLAS LUERLOCK TIP N CTRL DISP

## (undated) DEVICE — SOLUTION IV 1000ML 0.9% SOD CHL

## (undated) DEVICE — (D)SYR 20ML LR LCK 1ML GRAD -- DISC BY MFR  USE ITEM 304149

## (undated) DEVICE — TRAY PREP DRY W/ PREM GLV 2 APPL 6 SPNG 2 UNDPD 1 OVERWRAP

## (undated) DEVICE — GDWIRE 3CM FLX-TIP 0.038X150CM -- BX/5 SENSOR

## (undated) DEVICE — STERILE LATEX POWDER-FREE SURGICAL GLOVESWITH NITRILE COATING: Brand: PROTEXIS

## (undated) DEVICE — SPONGE GZ W4XL4IN COT 12 PLY TYP VII WVN C FLD DSGN

## (undated) DEVICE — SOLUTION IRRIG 1000ML H2O STRL BLT

## (undated) DEVICE — BAG DRNGE NONSTERILE W/ SUCT HOSE CYSTO/UROLOGICAL FOR GE

## (undated) DEVICE — SNAP KOVER: Brand: UNBRANDED